# Patient Record
Sex: FEMALE | Race: BLACK OR AFRICAN AMERICAN
[De-identification: names, ages, dates, MRNs, and addresses within clinical notes are randomized per-mention and may not be internally consistent; named-entity substitution may affect disease eponyms.]

---

## 2020-11-01 ENCOUNTER — HOSPITAL ENCOUNTER (EMERGENCY)
Dept: HOSPITAL 72 - EMR | Age: 35
Discharge: HOME | End: 2020-11-01
Payer: SELF-PAY

## 2020-11-01 VITALS — DIASTOLIC BLOOD PRESSURE: 85 MMHG | SYSTOLIC BLOOD PRESSURE: 125 MMHG

## 2020-11-01 VITALS — SYSTOLIC BLOOD PRESSURE: 122 MMHG | DIASTOLIC BLOOD PRESSURE: 70 MMHG

## 2020-11-01 VITALS — DIASTOLIC BLOOD PRESSURE: 70 MMHG | SYSTOLIC BLOOD PRESSURE: 122 MMHG

## 2020-11-01 VITALS — BODY MASS INDEX: 0.83 KG/M2 | WEIGHT: 5.44 LBS | HEIGHT: 68 IN

## 2020-11-01 DIAGNOSIS — Z88.0: ICD-10-CM

## 2020-11-01 DIAGNOSIS — R10.84: Primary | ICD-10-CM

## 2020-11-01 DIAGNOSIS — R11.2: ICD-10-CM

## 2020-11-01 LAB
ADD MANUAL DIFF: NO
ALBUMIN SERPL-MCNC: 3.8 G/DL (ref 3.4–5)
ALBUMIN/GLOB SERPL: 1 {RATIO} (ref 1–2.7)
ALP SERPL-CCNC: 54 U/L (ref 46–116)
ALT SERPL-CCNC: 13 U/L (ref 12–78)
ANION GAP SERPL CALC-SCNC: 13 MMOL/L (ref 5–15)
APPEARANCE UR: (no result)
APTT PPP: (no result) S
AST SERPL-CCNC: 19 U/L (ref 15–37)
BASOPHILS NFR BLD AUTO: 1.3 % (ref 0–2)
BILIRUB SERPL-MCNC: 0.5 MG/DL (ref 0.2–1)
BUN SERPL-MCNC: 6 MG/DL (ref 7–18)
CALCIUM SERPL-MCNC: 8.4 MG/DL (ref 8.5–10.1)
CHLORIDE SERPL-SCNC: 105 MMOL/L (ref 98–107)
CO2 SERPL-SCNC: 23 MMOL/L (ref 21–32)
CREAT SERPL-MCNC: 0.9 MG/DL (ref 0.55–1.3)
EOSINOPHIL NFR BLD AUTO: 0.5 % (ref 0–3)
ERYTHROCYTE [DISTWIDTH] IN BLOOD BY AUTOMATED COUNT: 17.4 % (ref 11.6–14.8)
GLOBULIN SER-MCNC: 3.9 G/DL
GLUCOSE UR STRIP-MCNC: NEGATIVE MG/DL
HCT VFR BLD CALC: 32.7 % (ref 37–47)
HGB BLD-MCNC: 9.7 G/DL (ref 12–16)
KETONES UR QL STRIP: (no result)
LEUKOCYTE ESTERASE UR QL STRIP: NEGATIVE
LYMPHOCYTES NFR BLD AUTO: 26.4 % (ref 20–45)
MCV RBC AUTO: 74 FL (ref 80–99)
MONOCYTES NFR BLD AUTO: 12.5 % (ref 1–10)
NEUTROPHILS NFR BLD AUTO: 59.3 % (ref 45–75)
NITRITE UR QL STRIP: NEGATIVE
PH UR STRIP: 8 [PH] (ref 4.5–8)
PLATELET # BLD: 184 K/UL (ref 150–450)
POTASSIUM SERPL-SCNC: 3.3 MMOL/L (ref 3.5–5.1)
PROT UR QL STRIP: (no result)
RBC # BLD AUTO: 4.41 M/UL (ref 4.2–5.4)
SODIUM SERPL-SCNC: 141 MMOL/L (ref 136–145)
SP GR UR STRIP: 1.01 (ref 1–1.03)
UROBILINOGEN UR-MCNC: NORMAL MG/DL (ref 0–1)
WBC # BLD AUTO: 4.7 K/UL (ref 4.8–10.8)

## 2020-11-01 PROCEDURE — 85025 COMPLETE CBC W/AUTO DIFF WBC: CPT

## 2020-11-01 PROCEDURE — 81003 URINALYSIS AUTO W/O SCOPE: CPT

## 2020-11-01 PROCEDURE — 96361 HYDRATE IV INFUSION ADD-ON: CPT

## 2020-11-01 PROCEDURE — 80053 COMPREHEN METABOLIC PANEL: CPT

## 2020-11-01 PROCEDURE — 36415 COLL VENOUS BLD VENIPUNCTURE: CPT

## 2020-11-01 PROCEDURE — 99284 EMERGENCY DEPT VISIT MOD MDM: CPT

## 2020-11-01 PROCEDURE — 80307 DRUG TEST PRSMV CHEM ANLYZR: CPT

## 2020-11-01 PROCEDURE — 96374 THER/PROPH/DIAG INJ IV PUSH: CPT

## 2020-11-01 PROCEDURE — 83690 ASSAY OF LIPASE: CPT

## 2020-11-01 PROCEDURE — 96375 TX/PRO/DX INJ NEW DRUG ADDON: CPT

## 2020-11-01 PROCEDURE — 96376 TX/PRO/DX INJ SAME DRUG ADON: CPT

## 2020-11-01 PROCEDURE — 81025 URINE PREGNANCY TEST: CPT

## 2020-11-01 PROCEDURE — 87086 URINE CULTURE/COLONY COUNT: CPT

## 2020-11-01 NOTE — NUR
ED Nurse Note:



DR Lopez at the bed side. Patient awake and responding well at this time. 
Patient reports lower abd pain and not actively vomiting at this time.

## 2020-11-01 NOTE — NUR
ED Nurse Note:



Patient brought in by ambulance  from home with c/o abdominal pain onset 
2 days ago. Patient rates pain 10/10 and on epigastric area. Patient has hx of 
ulcers and was seen at [facility] for same problem and was taking antacids. Patient 
is nauseated and vomited at bedside approx 20mls of gastric juice. Patient 
denies injury/trauma, fever and chills, CP/SOB/, blood in stool. Patient is 
AAOX4 and ambulatory

## 2020-11-01 NOTE — NUR
-------------------------------------------------------------------------------

           *** Note dio in EDM - 11/01/20 at 0832 by JAS ***            

-------------------------------------------------------------------------------

ER DISCHARGE NOTE:



Patient is cleared to be discharged per ERMD, pt is aox4, on room air, with 
stable vital signs. pt was given dc and prescription instructions, pt was able 
to verbalize understanding, pt id band and iv site removed without 
complications. pt was assisted on a wheelchair to a car. pt took all belongings 
and left with her roommate.

## 2020-11-01 NOTE — NUR
ER DISCHARGE NOTE:



Patient is cleared to be discharged per ERMD, pt is aox4, on room air, with 
stable vital signs. pt was given dc and prescription instructions, pt was able 
to verbalize understanding, pt id band and iv site removed without 
complications. pt was assisted on a wheelchair to a car. pt took all belongings 
and left with her roommate.

## 2020-11-01 NOTE — EMERGENCY ROOM REPORT
History of Present Illness


General


Chief Complaint:  To Be Triaged


Source:  Patient, EMS


 (Keyur Masters MD)





Present Illness


HPI


This is a 35-year-old female with history of chronic abdominal pain.  She says 

she gets it frequently since 2018.  She said is due to stress.  She presents 

with complaint abdominal pain.  Onset for last 2 days.  She has nausea and 

vomiting.  Unable to keep anything down.  No fever or chills.  No diarrhea.  

Pain is 10 out of 10.  Nothing made it better.  Nothing made it worse.  She was 

just at [facility] yesterday and was discharged with prescription for medication.  

She says she is unable to fill the until Monday.  Pain came back so she called 

[phone].


 (Keyur Masters MD)


Allergies:  


Uncoded Allergies:  


     PENICILLIN (Allergy, Mild, 20)





COVID-19 Screening


Contact w/high risk pt:  No


Experienced COVID-19 symptoms?:  Yes


COVID-19 Testing performed PTA:  No


 (Keyur Masters MD)





Patient History


Past Medical History:  see triage record, old chart reviewed


Past Surgical History:  other


Pertinent Family History:  none


Social History:  Denies: smoking


Last Menstrual Period:  UNK


Pregnant Now:  No


:  0


Para:  0


Immunizations:  other


Reviewed Nursing Documentation:  PMH: Agreed; PSxH: Agreed (Keyur Masters MD)





Nursing Documentation-PMH


Past Medical History:  No History, Except For


Hx Gastrointestinal Problems:  Yes - ULCERS 


 (Keyur Masters MD)





Review of Systems


Eye:  Denies: eye pain, blurred vision


ENT:  Denies: ear pain, nose congestion, throat swelling


Respiratory:  Denies: cough, shortness of breath


Cardiovascular:  Denies: chest pain, palpitations


Gastrointestinal:  Reports: abdominal pain, nausea, vomiting; Denies: diarrhea


Musculoskeletal:  Denies: back pain, joint pain


Skin:  Denies: rash


Neurological:  Denies: headache, numbness


Endocrine:  Denies: increased thirst, increased urine


Hematologic/Lymphatic:  Denies: easy bruising


All Other Systems:  negative except mentioned in HPI


 (Keyur Masters MD)





Physical Exam





Vital Signs








  Date Time  Temp Pulse Resp B/P (MAP) Pulse Ox O2 Delivery O2 Flow Rate FiO2


 


20 05:22 98.2 70 24 116/72 (87) 99 Room Air  





Vitals normal


Sp02 EP Interpretation:  reviewed, normal


General Appearance:  well appearing, no apparent distress, alert


Head:  normocephalic, atraumatic


Eyes:  bilateral eye PERRL, bilateral eye EOMI


ENT:  hearing grossly normal, normal pharynx


Neck:  full range of motion, supple, no meningismus


Respiratory:  chest non-tender, lungs clear, normal breath sounds


Cardiovascular #1:  regular rate, rhythm, no murmur


Gastrointestinal:  no mass, no organomegaly, no bruit, non-distended, tenderness

- Diffuse.  Mild.  Abdomen is soft., decreased bowel sounds


Musculoskeletal:  back normal, normal range of motion, gait/station normal


Psychiatric:  mood/affect normal


 (Keyur Masters MD)





Medical Decision Making


Diagnostic Impression:  


   Primary Impression:  


   Abdominal pain


   Qualified Codes:  R10.84 - Generalized abdominal pain


   Additional Impressions:  


   Vomiting


   Qualified Codes:  R11.2 - Nausea with vomiting, unspecified


   Cannabinoid hyperemesis syndrome


ER Course


Patient presents with abdominal pain.  This appear to be exacerbation of chronic

problem.  Abdominal exam is soft.  Labs are pending.  I will sign this patient 

out to Dr. Lopez.


 (Keyur Masters MD)


ER Course


Patient was initially seen by Dr. Keyur Masters and and then signed out to me for 

final disposition.  I have also seen the patient.  And fully examined the 

patient.  Patient's physical exam are such that she was nauseous and slightly 

tender in the epigastrium.  Patient received pain medications prior to my 

evaluation.  Of which patient was very comfortable when I saw her.  With only 

very mild epigastric discomfort.  She was a little sleepy.  Patient's laboratory

work-up was negative.  Patient received a total of 2 L fluid bolus in the 

emergency department.  Patient was reevaluated after receiving the fluid bolus 

pain medications and laboratory work-up.  Patient states that she has no 

symptoms at this time would like to be discharged.  Patient states that she does

have a history of this occurring in the past.  She was actually seen at [facility] 

where she had a full work-up and was given a prescription for Zofran and Pepcid.

 We also discovered the THC in patient's urine.  I informed the patient that 

this sometimes can cause cannabinoid hyperemesis syndrome and advised that she 

discontinue any further use of THC.  Patient voiced understanding.  Patient was 

given a prescription for Zofran and omeprazole.  Patient is advised to follow up

with primary doctor in 2-3 days and return the emergency room for any worsening 

symptoms and as needed.





Labs








Test


 20


05:40 20


06:00


 


White Blood Count


 4.7 K/UL


(4.8-10.8) 





 


Red Blood Count


 4.41 M/UL


(4.20-5.40) 





 


Hemoglobin


 9.7 G/DL


(12.0-16.0) 





 


Hematocrit


 32.7 %


(37.0-47.0) 





 


Mean Corpuscular Volume 74 FL (80-99)  


 


Mean Corpuscular Hemoglobin


 22.1 PG


(27.0-31.0) 





 


Mean Corpuscular Hemoglobin


Concent 29.8 G/DL


(32.0-36.0) 





 


Red Cell Distribution Width


 17.4 %


(11.6-14.8) 





 


Platelet Count


 184 K/UL


(150-450) 





 


Mean Platelet Volume


 14.4 FL


(6.5-10.1) 





 


Neutrophils (%) (Auto)


 59.3 %


(45.0-75.0) 





 


Lymphocytes (%) (Auto)


 26.4 %


(20.0-45.0) 





 


Monocytes (%) (Auto)


 12.5 %


(1.0-10.0) 





 


Eosinophils (%) (Auto)


 0.5 %


(0.0-3.0) 





 


Basophils (%) (Auto)


 1.3 %


(0.0-2.0) 





 


Sodium Level


 141 MMOL/L


(136-145) 





 


Potassium Level


 3.3 MMOL/L


(3.5-5.1) 





 


Chloride Level


 105 MMOL/L


() 





 


Carbon Dioxide Level


 23 MMOL/L


(21-32) 





 


Anion Gap


 13 mmol/L


(5-15) 





 


Blood Urea Nitrogen 6 mg/dL (7-18)  


 


Creatinine


 0.9 MG/DL


(0.55-1.30) 





 


Estimat Glomerular Filtration


Rate > 60 mL/min


(>60) 





 


Glucose Level


 96 MG/DL


() 





 


Calcium Level


 8.4 MG/DL


(8.5-10.1) 





 


Total Bilirubin


 0.5 MG/DL


(0.2-1.0) 





 


Aspartate Amino Transf


(AST/SGOT) 19 U/L (15-37) 


 





 


Alanine Aminotransferase


(ALT/SGPT) 13 U/L (12-78) 


 





 


Alkaline Phosphatase


 54 U/L


() 





 


Total Protein


 7.7 G/DL


(6.4-8.2) 





 


Albumin


 3.8 G/DL


(3.4-5.0) 





 


Globulin 3.9 g/dL  


 


Albumin/Globulin Ratio 1.0 (1.0-2.7)  


 


Lipase


 176 U/L


() 





 


Urine Color  Pale yellow 


 


Urine Appearance


 


 Slightly


cloudy


 


Urine pH  8 (4.5-8.0) 


 


Urine Specific Gravity


 


 1.015


(1.005-1.035)


 


Urine Protein  1+ (NEGATIVE) 


 


Urine Glucose (UA)


 


 Negative


(NEGATIVE)


 


Urine Ketones  4+ (NEGATIVE) 


 


Urine Blood


 


 Negative


(NEGATIVE)


 


Urine Nitrite


 


 Negative


(NEGATIVE)


 


Urine Bilirubin


 


 Negative


(NEGATIVE)


 


Urine Urobilinogen


 


 Normal MG/DL


(0.0-1.0)


 


Urine Leukocyte Esterase


 


 Negative


(NEGATIVE)


 


Urine RBC


 


 0-2 /HPF (0 -


2)


 


Urine WBC


 


 0-2 /HPF (0 -


2)


 


Urine Squamous Epithelial


Cells 


 Moderate /LPF


(NONE/OCC)


 


Urine Bacteria


 


 Many /HPF


(NONE)


 


Urine HCG, Qualitative


 


 Negative


(NEGATIVE)


 


Urine Opiates Screen


 


 Negative


(NEGATIVE)


 


Urine Barbiturates Screen


 


 Negative


(NEGATIVE)


 


Phencyclidine (PCP) Screen


 


 Negative


(NEGATIVE)


 


Urine Amphetamines Screen


 


 Negative


(NEGATIVE)


 


Urine Benzodiazepines Screen


 


 Negative


(NEGATIVE)


 


Urine Cocaine Screen


 


 Negative


(NEGATIVE)


 


Urine Marijuana (THC) Screen


 


 Positive


(NEGATIVE)








 (Sunil Lopez MD)





Last Vital Signs








  Date Time  Temp Pulse Resp B/P (MAP) Pulse Ox O2 Delivery O2 Flow Rate FiO2


 


20 05:22 98.2 70 24 116/72 (87) 99 Room Air  








Status:  improved


 (Keyur Masters MD)


Status:  improved


 (Sunil Lopez MD)


Disposition:  HOME, SELF-CARE


Condition:  Stable


Scripts


Ondansetron (Zofran) 4 Mg Tablet


4 MG ORAL Q6H PRN for Nausea & Vomiting, #20 TAB 0 Refills


   Prov: Sunil Lopez MD         20 


Omeprazole (OMEPRAZOLE) 40 Mg Capsule.


40 MG ORAL DAILY for Gerd, #30 CAP


   Prov: Sunil Lopez MD         20











Keyur Masters MD                   2020 05:38


Sunil Lopez MD                 2020 07:45

## 2020-11-01 NOTE — NUR
-------------------------------------------------------------------------------

           *** Note dio in EDM - 11/01/20 at 0831 by JAS ***            

-------------------------------------------------------------------------------

ER DISCHARGE NOTE:



Patient is cleared to be discharged per ERMD, pt is aox4, on room air, with 
stable vital signs. pt was given dc and prescription instructions, pt was able 
to verbalize understanding, pt id band and iv site removed without 
complications. pt is able to ambulate with steady gait. pt took all belongings.

## 2020-11-02 ENCOUNTER — HOSPITAL ENCOUNTER (EMERGENCY)
Dept: HOSPITAL 72 - EMR | Age: 35
Discharge: HOME | End: 2020-11-02
Payer: SELF-PAY

## 2020-11-02 VITALS — WEIGHT: 178 LBS | BODY MASS INDEX: 27.94 KG/M2 | HEIGHT: 67 IN

## 2020-11-02 VITALS — SYSTOLIC BLOOD PRESSURE: 133 MMHG | DIASTOLIC BLOOD PRESSURE: 74 MMHG

## 2020-11-02 VITALS — SYSTOLIC BLOOD PRESSURE: 129 MMHG | DIASTOLIC BLOOD PRESSURE: 78 MMHG

## 2020-11-02 DIAGNOSIS — Z88.0: ICD-10-CM

## 2020-11-02 DIAGNOSIS — R10.84: ICD-10-CM

## 2020-11-02 DIAGNOSIS — F12.988: Primary | ICD-10-CM

## 2020-11-02 DIAGNOSIS — R11.10: ICD-10-CM

## 2020-11-02 LAB
ADD MANUAL DIFF: NO
ALBUMIN SERPL-MCNC: 3.7 G/DL (ref 3.4–5)
ALBUMIN/GLOB SERPL: 0.9 {RATIO} (ref 1–2.7)
ALP SERPL-CCNC: 51 U/L (ref 46–116)
ALT SERPL-CCNC: 13 U/L (ref 12–78)
ANION GAP SERPL CALC-SCNC: 14 MMOL/L (ref 5–15)
APPEARANCE UR: CLEAR
APTT PPP: (no result) S
AST SERPL-CCNC: 17 U/L (ref 15–37)
BASOPHILS NFR BLD AUTO: 2.2 % (ref 0–2)
BILIRUB SERPL-MCNC: 0.6 MG/DL (ref 0.2–1)
BUN SERPL-MCNC: 4 MG/DL (ref 7–18)
CALCIUM SERPL-MCNC: 8.5 MG/DL (ref 8.5–10.1)
CHLORIDE SERPL-SCNC: 104 MMOL/L (ref 98–107)
CO2 SERPL-SCNC: 22 MMOL/L (ref 21–32)
CREAT SERPL-MCNC: 0.9 MG/DL (ref 0.55–1.3)
EOSINOPHIL NFR BLD AUTO: 0 % (ref 0–3)
ERYTHROCYTE [DISTWIDTH] IN BLOOD BY AUTOMATED COUNT: 17.1 % (ref 11.6–14.8)
GLOBULIN SER-MCNC: 3.9 G/DL
GLUCOSE UR STRIP-MCNC: NEGATIVE MG/DL
HCT VFR BLD CALC: 32.3 % (ref 37–47)
HGB BLD-MCNC: 9.4 G/DL (ref 12–16)
KETONES UR QL STRIP: (no result)
LEUKOCYTE ESTERASE UR QL STRIP: NEGATIVE
LYMPHOCYTES NFR BLD AUTO: 53.2 % (ref 20–45)
MCV RBC AUTO: 76 FL (ref 80–99)
MONOCYTES NFR BLD AUTO: 7.1 % (ref 1–10)
NEUTROPHILS NFR BLD AUTO: 37.5 % (ref 45–75)
NITRITE UR QL STRIP: NEGATIVE
PH UR STRIP: 6 [PH] (ref 4.5–8)
PLATELET # BLD: 162 K/UL (ref 150–450)
POTASSIUM SERPL-SCNC: 3.2 MMOL/L (ref 3.5–5.1)
PROT UR QL STRIP: NEGATIVE
RBC # BLD AUTO: 4.23 M/UL (ref 4.2–5.4)
SODIUM SERPL-SCNC: 140 MMOL/L (ref 136–145)
SP GR UR STRIP: 1.02 (ref 1–1.03)
UROBILINOGEN UR-MCNC: NORMAL MG/DL (ref 0–1)
WBC # BLD AUTO: 9.1 K/UL (ref 4.8–10.8)

## 2020-11-02 PROCEDURE — 99284 EMERGENCY DEPT VISIT MOD MDM: CPT

## 2020-11-02 PROCEDURE — 83690 ASSAY OF LIPASE: CPT

## 2020-11-02 PROCEDURE — 81003 URINALYSIS AUTO W/O SCOPE: CPT

## 2020-11-02 PROCEDURE — 85025 COMPLETE CBC W/AUTO DIFF WBC: CPT

## 2020-11-02 PROCEDURE — 36415 COLL VENOUS BLD VENIPUNCTURE: CPT

## 2020-11-02 PROCEDURE — 81025 URINE PREGNANCY TEST: CPT

## 2020-11-02 PROCEDURE — 96374 THER/PROPH/DIAG INJ IV PUSH: CPT

## 2020-11-02 PROCEDURE — 96376 TX/PRO/DX INJ SAME DRUG ADON: CPT

## 2020-11-02 PROCEDURE — 96375 TX/PRO/DX INJ NEW DRUG ADDON: CPT

## 2020-11-02 PROCEDURE — 96361 HYDRATE IV INFUSION ADD-ON: CPT

## 2020-11-02 PROCEDURE — 80053 COMPREHEN METABOLIC PANEL: CPT

## 2020-11-02 NOTE — NUR
ED Nurse Note:



Patient brought in by ambulance  from home d/t n/v and abdominal pain 
10/10 for 3 days, patient reports she was diagnosed with stomach ulcers 
yesterday. Patient aao x 4 and ambulatory. Patient changed into gown and placed 
on cardiac monitor. No acute distress noted during assessment.

## 2020-11-02 NOTE — EMERGENCY ROOM REPORT
History of Present Illness


General


Chief Complaint:  Abdominal Pain


Source:  Patient


 (Keyur Masters MD)





Present Illness


HPI


Is a 35-year-old female with a history of cyclic vomiting syndrome secondary to 

marijuana.  She presents with chief complaint abdominal pain with nausea and 

vomiting.  Onset today.  She was here 24 hours ago for the same thing.  Before 

that she was at [facility].  She said that she could not fill her medication until 

Monday.  This is a recurrent issue for her.  She did well when she was here 

yesterday after IV fluid and pain medication.  She was able to tolerate p.o. 

intake.  When she went home symptoms came back when she started trying to eat 

and drink again.  Pain is sharp in nature.  10 out of 10.  Vomiting is nonbloody

nonbilious.  Nothing made it better.  Drinking made it worse.  Denies any other 

complaint.


 (Keyur Masters MD)


Allergies:  


Uncoded Allergies:  


     PENICILLIN (Allergy, Mild, 11/1/20)





COVID-19 Screening


Contact w/high risk pt:  No


Experienced COVID-19 symptoms?:  No


COVID-19 Testing performed PTA:  No


 (Keyur Masters MD)





Patient History


Past Medical History:  see triage record, old chart reviewed


Past Surgical History:  none


Pertinent Family History:  none


Social History:  Denies: smoking


Last Menstrual Period:  10/01/20


Pregnant Now:  No


Immunizations:  other


Reviewed Nursing Documentation:  PMH: Agreed; PSxH: Agreed (Keyur Masters MD)





Nursing Documentation-PMH


Past Medical History:  No History, Except For


Hx Gastrointestinal Problems:  Yes - ULCERS 


 (Keyur Masters MD)





Review of Systems


Eye:  Denies: eye pain, blurred vision


ENT:  Denies: ear pain, nose congestion, throat swelling


Respiratory:  Denies: cough, shortness of breath


Cardiovascular:  Denies: chest pain, palpitations


Gastrointestinal:  Reports: abdominal pain, nausea, vomiting; Denies: diarrhea


Musculoskeletal:  Denies: back pain, joint pain


Skin:  Denies: rash


Neurological:  Denies: headache, numbness


Endocrine:  Denies: increased thirst, increased urine


Hematologic/Lymphatic:  Denies: easy bruising


All Other Systems:  negative except mentioned in HPI


 (Keyur Masters MD)





Physical Exam





Vital Signs








  Date Time  Temp Pulse Resp B/P (MAP) Pulse Ox O2 Delivery O2 Flow Rate FiO2


 


11/2/20 04:40 99.0 54 18 133/74 (93) 100 Room Air  





Vitals normal


Sp02 EP Interpretation:  reviewed, normal


General Appearance:  well appearing, no apparent distress, alert


Head:  normocephalic, atraumatic


Eyes:  bilateral eye PERRL, bilateral eye EOMI


ENT:  hearing grossly normal, normal pharynx


Neck:  full range of motion, supple, no meningismus


Respiratory:  chest non-tender, lungs clear, normal breath sounds


Cardiovascular #1:  regular rate, rhythm, no murmur


Gastrointestinal:  normal bowel sounds, no mass, no organomegaly, no bruit, non-

distended, tenderness - Diffuse


Musculoskeletal:  back normal, normal range of motion, gait/station normal


Psychiatric:  mood/affect normal


 (Keyur Masters MD)





Medical Decision Making


Diagnostic Impression:  


   Primary Impression:  


   Abdominal pain


   Qualified Codes:  R10.84 - Generalized abdominal pain


   Additional Impression:  


   Cannabinoid hyperemesis syndrome


ER Course


Presents with abdominal pain with vomiting.  This is probably cyclic vomiting 

syndrome secondary to cannabis use.  No evidence of an acute abdomen.  Patient 

will be hydrated.  If not better, may need to be admitted to the hospital.


 (Keyur Masters MD)


ER Course


Patient signed out to me pending reassessment.  She has had multiple recent 

visits for cyclical vomiting.  Patient observed by me for an additional 2 hours 

with no recurrent vomiting.  Sleeping comfortably.  Pain controlled.  On my 

reassessment feeling improved.  Will be discharged home.  She does have 

prescriptions for antiemetics that she has not filled from her recent visit.  I 

instructed her to fill this medication today.  Avoid marijuana use.  And follow-

up with PMD.  Given return precautions.


 (Brandon Maguire M.D.)





Last Vital Signs








  Date Time  Temp Pulse Resp B/P (MAP) Pulse Ox O2 Delivery O2 Flow Rate FiO2


 


11/2/20 04:40 99.0 54 18 133/74 (93) 100 Room Air  








Status:  improved


 (Keyur Masters MD)


Disposition:  HOME, SELF-CARE


Condition:  Stable


Referrals:  


NOT CHOSEN IPA/MD,REFERRING (PCP)











Keyur Masters MD                   Nov 2, 2020 04:52


Brandon Maguire M.D.             Nov 2, 2020 07:22

## 2020-11-02 NOTE — NUR
ER DISCHARGE NOTE: Patient is cleared to be discharged per ERMD, pt is aox4, on 
room air, with stable vital signs. pt was given dc and prescription 
instructions, pt was able to verbalize understanding, pt id band and iv site 
removed without complications. pt is able to ambulate with steady gait. pt took 
all belongings.

## 2020-11-03 ENCOUNTER — HOSPITAL ENCOUNTER (INPATIENT)
Dept: HOSPITAL 72 - EMR | Age: 35
LOS: 3 days | Discharge: HOME | DRG: 425 | End: 2020-11-06
Payer: MEDICAID

## 2020-11-03 VITALS — HEIGHT: 67 IN | BODY MASS INDEX: 27.31 KG/M2 | WEIGHT: 174 LBS

## 2020-11-03 VITALS — DIASTOLIC BLOOD PRESSURE: 81 MMHG | SYSTOLIC BLOOD PRESSURE: 122 MMHG

## 2020-11-03 VITALS — SYSTOLIC BLOOD PRESSURE: 107 MMHG | DIASTOLIC BLOOD PRESSURE: 64 MMHG

## 2020-11-03 VITALS — SYSTOLIC BLOOD PRESSURE: 113 MMHG | DIASTOLIC BLOOD PRESSURE: 69 MMHG

## 2020-11-03 VITALS — SYSTOLIC BLOOD PRESSURE: 107 MMHG | DIASTOLIC BLOOD PRESSURE: 59 MMHG

## 2020-11-03 DIAGNOSIS — D50.9: ICD-10-CM

## 2020-11-03 DIAGNOSIS — Z88.0: ICD-10-CM

## 2020-11-03 DIAGNOSIS — F12.10: ICD-10-CM

## 2020-11-03 DIAGNOSIS — R10.9: ICD-10-CM

## 2020-11-03 DIAGNOSIS — D69.6: ICD-10-CM

## 2020-11-03 DIAGNOSIS — R11.2: ICD-10-CM

## 2020-11-03 DIAGNOSIS — E87.6: Primary | ICD-10-CM

## 2020-11-03 LAB
ADD MANUAL DIFF: NO
ADD MANUAL DIFF: YES
ALBUMIN SERPL-MCNC: 4.2 G/DL (ref 3.4–5)
ALBUMIN/GLOB SERPL: 1 {RATIO} (ref 1–2.7)
ALP SERPL-CCNC: 60 U/L (ref 46–116)
ALT SERPL-CCNC: 9 U/L (ref 12–78)
ANION GAP SERPL CALC-SCNC: 16 MMOL/L (ref 5–15)
APPEARANCE UR: (no result)
APTT PPP: (no result) S
AST SERPL-CCNC: 16 U/L (ref 15–37)
BASOPHILS NFR BLD AUTO: 4.7 % (ref 0–2)
BILIRUB SERPL-MCNC: 0.6 MG/DL (ref 0.2–1)
BUN SERPL-MCNC: 5 MG/DL (ref 7–18)
CALCIUM SERPL-MCNC: 8.9 MG/DL (ref 8.5–10.1)
CHLORIDE SERPL-SCNC: 101 MMOL/L (ref 98–107)
CO2 SERPL-SCNC: 22 MMOL/L (ref 21–32)
CREAT SERPL-MCNC: 0.9 MG/DL (ref 0.55–1.3)
EOSINOPHIL NFR BLD AUTO: 0 % (ref 0–3)
ERYTHROCYTE [DISTWIDTH] IN BLOOD BY AUTOMATED COUNT: 17.4 % (ref 11.6–14.8)
ERYTHROCYTE [DISTWIDTH] IN BLOOD BY AUTOMATED COUNT: 17.7 % (ref 11.6–14.8)
GLOBULIN SER-MCNC: 4.3 G/DL
GLUCOSE UR STRIP-MCNC: NEGATIVE MG/DL
HCT VFR BLD CALC: 31.9 % (ref 37–47)
HCT VFR BLD CALC: 36.9 % (ref 37–47)
HGB BLD-MCNC: 10.3 G/DL (ref 12–16)
HGB BLD-MCNC: 8.8 G/DL (ref 12–16)
KETONES UR QL STRIP: (no result)
LEUKOCYTE ESTERASE UR QL STRIP: NEGATIVE
LYMPHOCYTES NFR BLD AUTO: 13.9 % (ref 20–45)
MCV RBC AUTO: 79 FL (ref 80–99)
MCV RBC AUTO: 80 FL (ref 80–99)
MONOCYTES NFR BLD AUTO: 13.1 % (ref 1–10)
NEUTROPHILS NFR BLD AUTO: 68.3 % (ref 45–75)
NITRITE UR QL STRIP: NEGATIVE
PH UR STRIP: 6 [PH] (ref 4.5–8)
PLATELET # BLD: 147 K/UL (ref 150–450)
PLATELET # BLD: 60 K/UL (ref 150–450)
POTASSIUM SERPL-SCNC: 3 MMOL/L (ref 3.5–5.1)
PROT UR QL STRIP: NEGATIVE
RBC # BLD AUTO: 4.01 M/UL (ref 4.2–5.4)
RBC # BLD AUTO: 4.67 M/UL (ref 4.2–5.4)
SODIUM SERPL-SCNC: 139 MMOL/L (ref 136–145)
SP GR UR STRIP: 1.01 (ref 1–1.03)
UROBILINOGEN UR-MCNC: NORMAL MG/DL (ref 0–1)
WBC # BLD AUTO: 10.7 K/UL (ref 4.8–10.8)
WBC # BLD AUTO: 4.8 K/UL (ref 4.8–10.8)

## 2020-11-03 PROCEDURE — 96372 THER/PROPH/DIAG INJ SC/IM: CPT

## 2020-11-03 PROCEDURE — 83615 LACTATE (LD) (LDH) ENZYME: CPT

## 2020-11-03 PROCEDURE — 82607 VITAMIN B-12: CPT

## 2020-11-03 PROCEDURE — 87340 HEPATITIS B SURFACE AG IA: CPT

## 2020-11-03 PROCEDURE — 82728 ASSAY OF FERRITIN: CPT

## 2020-11-03 PROCEDURE — 87086 URINE CULTURE/COLONY COUNT: CPT

## 2020-11-03 PROCEDURE — 76700 US EXAM ABDOM COMPLETE: CPT

## 2020-11-03 PROCEDURE — 80053 COMPREHEN METABOLIC PANEL: CPT

## 2020-11-03 PROCEDURE — 86709 HEPATITIS A IGM ANTIBODY: CPT

## 2020-11-03 PROCEDURE — 82150 ASSAY OF AMYLASE: CPT

## 2020-11-03 PROCEDURE — 82746 ASSAY OF FOLIC ACID SERUM: CPT

## 2020-11-03 PROCEDURE — 96375 TX/PRO/DX INJ NEW DRUG ADDON: CPT

## 2020-11-03 PROCEDURE — 82270 OCCULT BLOOD FECES: CPT

## 2020-11-03 PROCEDURE — 96376 TX/PRO/DX INJ SAME DRUG ADON: CPT

## 2020-11-03 PROCEDURE — 85007 BL SMEAR W/DIFF WBC COUNT: CPT

## 2020-11-03 PROCEDURE — 86703 HIV-1/HIV-2 1 RESULT ANTBDY: CPT

## 2020-11-03 PROCEDURE — 86803 HEPATITIS C AB TEST: CPT

## 2020-11-03 PROCEDURE — 96368 THER/DIAG CONCURRENT INF: CPT

## 2020-11-03 PROCEDURE — 86705 HEP B CORE ANTIBODY IGM: CPT

## 2020-11-03 PROCEDURE — 99285 EMERGENCY DEPT VISIT HI MDM: CPT

## 2020-11-03 PROCEDURE — 96365 THER/PROPH/DIAG IV INF INIT: CPT

## 2020-11-03 PROCEDURE — 83690 ASSAY OF LIPASE: CPT

## 2020-11-03 PROCEDURE — 80307 DRUG TEST PRSMV CHEM ANLYZR: CPT

## 2020-11-03 PROCEDURE — 81003 URINALYSIS AUTO W/O SCOPE: CPT

## 2020-11-03 PROCEDURE — 83550 IRON BINDING TEST: CPT

## 2020-11-03 PROCEDURE — 74177 CT ABD & PELVIS W/CONTRAST: CPT

## 2020-11-03 PROCEDURE — 83540 ASSAY OF IRON: CPT

## 2020-11-03 PROCEDURE — 85025 COMPLETE CBC W/AUTO DIFF WBC: CPT

## 2020-11-03 PROCEDURE — 85610 PROTHROMBIN TIME: CPT

## 2020-11-03 PROCEDURE — 36415 COLL VENOUS BLD VENIPUNCTURE: CPT

## 2020-11-03 RX ADMIN — LACTULOSE SCH GM: 20 SOLUTION ORAL at 17:47

## 2020-11-03 RX ADMIN — DEXTROSE, SODIUM CHLORIDE, AND POTASSIUM CHLORIDE SCH MLS/HR: 5; .45; .075 INJECTION INTRAVENOUS at 17:48

## 2020-11-03 NOTE — NUR
ED Nurse Note: Per , platelet count is not accurate due to"clamping on 
the smear" Dr. Dewitt notified and ordered to repeat Platelet count. DEDRA Farmer 
made kellyrre.

## 2020-11-03 NOTE — GENERAL PROGRESS NOTE
Subjective


ROS Limited/Unobtainable:  Yes


Allergies:  


Uncoded Allergies:  


     PENICILLIN (Allergy, Mild, 11/1/20)





Objective





Last 24 Hour Vital Signs








  Date Time  Temp Pulse Resp B/P (MAP) Pulse Ox O2 Delivery O2 Flow Rate FiO2


 


11/3/20 15:08 97.2 60 14 122/81 99 Room Air  


 


11/3/20 14:17 97.2       


 


11/3/20 13:05 97.2       


 


11/3/20 12:56  60 14 122/81 99 Room Air  


 


11/3/20 11:40 97.2 87 18 113/69 99 Room Air  


 


11/3/20 11:40  58 18   Room Air  


 


11/3/20 11:21 97.2 58 18 113/69 (84) 99 Room Air  








Laboratory Tests


11/3/20 11:52: 


White Blood Count 10.7, Red Blood Count 4.67, Hemoglobin 10.3L, Hematocrit 36.9L

, Mean Corpuscular Volume 79L, Mean Corpuscular Hemoglobin 22.1L, Mean 

Corpuscular Hemoglobin Concent 28.0L, Red Cell Distribution Width 17.4H, 

Platelet Count 60#L, Mean Platelet Volume 15.6H, Neutrophils (%) (Auto) , 

Lymphocytes (%) (Auto) , Monocytes (%) (Auto) , Eosinophils (%) (Auto) , 

Basophils (%) (Auto) , Differential Total Cells Counted 100, Neutrophils % 

(Manual) 69, Lymphocytes % (Manual) 25, Monocytes % (Manual) 6, Eosinophils % 

(Manual) 0, Basophils % (Manual) 0, Band Neutrophils 0, Platelet Estimate 

Adequate, Platelet Morphology See comment, Clumped Platelets 3+, Hypochromasia 

2+, Microcytosis 2+, Urine Color Pale yellow, Urine Appearance Slightly cloudy, 

Urine pH 6, Urine Specific Gravity 1.015, Urine Protein Negative, Urine Glucose 

(UA) Negative, Urine Ketones 4+H, Urine Blood Negative, Urine Nitrite Negative, 

Urine Bilirubin Negative, Urine Urobilinogen Normal, Urine Leukocyte Esterase 

Negative, Urine RBC 0, Urine WBC 0, Urine Squamous Epithelial Cells Few, Urine 

Bacteria ModerateH, Sodium Level 139, Potassium Level 3.0L, Chloride Level 101, 

Carbon Dioxide Level 22, Anion Gap 16H, Blood Urea Nitrogen 5L, Creatinine 0.9, 

Estimat Glomerular Filtration Rate > 60, Glucose Level 83, Calcium Level 8.9, 

Total Bilirubin 0.6, Aspartate Amino Transf (AST/SGOT) 16, Alanine 

Aminotransferase (ALT/SGPT) 9L, Alkaline Phosphatase 60, Total Protein 8.5H, 

Albumin 4.2, Globulin 4.3, Albumin/Globulin Ratio 1.0, Lipase 226, Urine Opiates

Screen Negative, Urine Barbiturates Screen Negative, Phencyclidine (PCP) Screen 

Negative, Urine Amphetamines Screen Negative, Urine Benzodiazepines Screen 

Negative, Urine Cocaine Screen Negative, Urine Marijuana (THC) Screen PositiveH


11/3/20 14:27: 


White Blood Count [Pending], Red Blood Count [Pending], Hemoglobin [Pending], 

Hematocrit [Pending], Mean Corpuscular Volume [Pending], Mean Corpuscular 

Hemoglobin [Pending], Mean Corpuscular Hemoglobin Concent [Pending], Red Cell 

Distribution Width [Pending], Platelet Count [Pending], Mean Platelet Volume 

[Pending], Neutrophils (%) (Auto) [Pending], Lymphocytes (%) (Auto) [Pending], 

Monocytes (%) (Auto) [Pending], Eosinophils (%) (Auto) [Pending], Basophils (%) 

(Auto) [Pending]


Height (Feet):  5


Height (Inches):  7.00


Weight (Pounds):  174


General Appearance:  alert


EENT:  normal ENT inspection


Neck:  supple


Cardiovascular:  normal rate


Respiratory/Chest:  lungs clear


Abdomen:  normal bowel sounds, non tender, soft


Extremities:  non-tender





Assessment/Plan


Problem List:  


(1) Cannabinoid hyperemesis syndrome


ICD Codes:  R11.2 - Nausea with vomiting, unspecified; F12.90 - Cannabis use, 

unspecified, uncomplicated


SNOMED:  256075287, [id number]


(2) Vomiting


ICD Codes:  R11.10 - Vomiting, unspecified


SNOMED:  228915687


(3) Abdominal pain


ICD Codes:  R10.9 - Unspecified abdominal pain


SNOMED:  21630511


(4) Thrombocytopenia


ICD Codes:  D69.6 - Thrombocytopenia, unspecified


SNOMED:  683915694


(5) Anemia


ICD Codes:  D64.9 - Anemia, unspecified


SNOMED:  504839400


(6) Hypokalemia


ICD Codes:  E87.6 - Hypokalemia


SNOMED:  76669569


Assessment/Plan:


IVF


pain control


control nausea


anemia work up


replace K


repeat labs


clears and advance as tolerated


avoid THC











Lewis Barnett MD              Nov 3, 2020 15:28

## 2020-11-03 NOTE — NUR
NURSE NOTES:



PATIENT REMAINS STABLE. ASLEEP. BED IN LOW AND LOCKED POSITION. CALL LIGHT WITHIN REACH.

## 2020-11-03 NOTE — NUR
ED Nurse Note:



Patient walked in to ER from home c/o abd N/V and abd pain since 10/31, pt 
states that she has been in and out of the hospital for these symptoms, has 
been taking famotidine and ondansetron as prescribed at home but they are not 
working. Patient presented anxious, AAO x4, VSS at this time.

## 2020-11-03 NOTE — EMERGENCY ROOM REPORT
History of Present Illness


General


Chief Complaint:  Abdominal Pain


Source:  Patient





Present Illness


HPI


35-year-old female with past medical history of marijuana use presents with 

chief complaint of diffuse abdominal pain.  She was seen yesterday and the day 

before in the emergency department.  She states that she has been compliant with

the medication she was prescribed.  She has been taking Zofran and Pepcid 

without relief of her abdominal pain, nausea, and vomiting.  She denies melena, 

hematochezia, dysuria, hematuria, fall, trauma, fever, chest pain, shortness of 

breath, cough or any other symptoms.


The patient's symptoms were gradual onset, severity was moderate, duration since

3 days.  She states she last smoked marijuana several weeks ago, however there 

is secondhand smoke in her house and has been exposed for the past several 

days..  


Quality: Aching.  Poorly localized.


Denies heavy menstrual bleeding





Past medical history: Marijuana use


Past surgical history:  Denies





Smoking:  Denies


Alcohol use:  Denies


Drug use: ++ Marijuana 





Review of systems:


CONST: No fevers ++ chills, No night sweats


PULMONARY: No productive cough,  No shortness of breath 


CARDIAC: No chest pain, No palpitations 


GI: ++ vomiting, No diarrhea , No melena_or_BRBPR 


: No dysuria, No hematuria, No discharge 


NEURO: No new_focal_weakness_or_numbness, No confusion, No vision changes


14 point Review of Systems is otherwise negative except per HPI





Physical Exam:


GENERAL: Awake_alert_ nontoxic, mild acute distress Spo2 98% on RA -normal.  

Actively vomiting.


EYES: Extraocular muscles are intact. Conjunctivae clear. Lids without swelling


ENT: External nose and ear normal_in_appearance. Oropharynx clear. 

Head_atraumatic, dry_oral_mucosa


NECK:  No JVD. No meningismus. No thyromegaly.  Supple. Trachea midline


RESP: Normal respiratory effort. Symmetric rise. No stridor. 

Clear_to_auscultation_No_rales_No_wheezes


CARDIAC: Bradycardic and regular rhytm. No_significant pedal edema.


Negative Langford sign.  Negative Rovsing's.  Negative obturator.  Diffuse 

voluntary guarding.  No CVA tenderness to palpation


ABDOMEN: Soft. Nondistended.  Nontender_No_rebound_or_guarding.


MSK:  Normal muscle tone, without rigidity.  Extremities without asymmetric 

deformity or swelling.  


SKIN: Warm and dry.  No visible cyanosis or pallor


NEUROLOGIC: Alert, oriented x3.  Motor_and_sensation_grossly_intact. No truncal 

ataxia. Gait_normal


Psych: Normal mood and affect, normal judgment and insight











- COORDINATION OF CARE


Case was discussed with: Patient  , Patient's Physician





Any labs and imaging that were ordered were interpreted as part of the medical 

decision making:








Medical Decision Making/Plan:





Differential diagnosis includes cholecystitis, choledocholithiasis, hepatitis, 

small bowel obstruction, volvulus, AAA, pancreatitis, atypical appendicitis, 

gastroparesis,  gastritis, peptic ulcer disease, among others.  





Patient is well appearing but is actively retching on examination.  She has 

diffuse voluntary guarding without any evidence of peritonitis.  I did review 

previous medical records.  She was seen here yesterday and the day before.  She 

was discharged with Zofran and Pepcid and instructed to not smoke marijuana 

anymore as this is likely contributing to her intractable nausea and vomiting.  

She states that she has quit, however she is exposed to secondhand smoke where 

she lives.


Labs show new onset thrombocytopenia.  No evidence of TTP.  No evidence of DIC. 

Patient is not actively bleeding anywhere.  Her platelets today are 60, down 

from 180 a few days ago.  She denies history of hematologic abnormality.  Will 

send repeat CBC to confirm.


Patient required several antiemetics here in the emergency department with only 

minimal relief of symptoms.  This is now her third visit in 3 days.  Will admit 

for observation as she has failed outpatient.


CT is negative for acute surgical pathology. 





I spoke with  , and reviewed the patients presentation, workup, 

results, and treatment.  


They will admit the patient for further care and evaluation, and assume care of 

the patient at this time.





The patient denies any bloody stool and has no pain out of proportion to exam, 

and no significant risk factors for mesenteric ischemia such as atrial 

fibrillation or severe PAD/PVD (peripheral arterial / vascular disease), thus 

definitive workup to rule out mesenteric ischemia was not pursued.  


Patient is afebrile, without any significant tenderness in the RUQ, and a 

negative Hostetter sign.  The patients presentation does not appear to be 

consistent with acute cholecystitis and thus definitive imaging to rule it out 

was not pursued.  


The patient has no significant risk factors for AAA (abdominal aortic aneurysm) 

such as age over 50 with history of hypertension, connective tissue disorder, or

1st degree relative with AAA.  the patient has normal dorsalis pedis pulses, no 

radiation of pain to the back, and no pulsatile mass felt on exam.  The 

patients profile was overall low risk for AAA and definitive workup was not 

pursued.


Allergies:  


Uncoded Allergies:  


     PENICILLIN (Allergy, Mild, 11/1/20)





COVID-19 Screening


Contact w/high risk pt:  No


Experienced COVID-19 symptoms?:  No


COVID-19 Testing performed PTA:  No





Nursing Documentation-PMH


Hx Gastrointestinal Problems:  Yes - ULCERS





Physical Exam





Vital Signs








  Date Time  Temp Pulse Resp B/P (MAP) Pulse Ox O2 Delivery O2 Flow Rate FiO2


 


11/3/20 11:21 97.2 58 18 113/69 (84) 99 Room Air  








Sp02 EP Interpretation:  reviewed, normal





Medical Decision Making


Diagnostic Impression:  


   Primary Impression:  


   Abdominal pain


   Additional Impressions:  


   Cannabinoid hyperemesis syndrome


   Vomiting


   Thrombocytopenia


   Marijuana abuse


   Anemia


   Hypokalemia





EKG Diagnostic Results


Troponin ordered:  No


ASA given to the pt in ED:  No





Rhythm Strip Diag. Results


Rhythm Strip Time:  14:01


EP Interpretation:  yes


Rate:  60


Rhythm:  NSR, no PVC's, no ectopy





CT/MRI/US Diagnostic Results


CT/MRI/US Diagnostic Results :  


   Impression


CT abdomen and pelvis preliminary read


Preliminary read


Normal appendix.  Small amount of pelvic free fluid, probably physiologic.  No 

acute disease.


Impression: Normal


Written by Nik Garcia MD 11/3/2020


Reevaluation Time:  14:01





Last Vital Signs








  Date Time  Temp Pulse Resp B/P (MAP) Pulse Ox O2 Delivery O2 Flow Rate FiO2


 


11/3/20 13:05 97.2       


 


11/3/20 12:56  60 14 122/81 99 Room Air  








Status:  improved


Disposition:  ADMITTED AS INPATIENT


Admit Decision Time:  14:01


Condition:  Stable


Referrals:  


NOT CHOSEN IPA/MD,REFERRING (PCP)











Norma Dewitt D.O.            Nov 3, 2020 13:38

## 2020-11-03 NOTE — NUR
NURSE NOTES:

Received report from DEDRA Martinez. Rounds done, patient alert, oriented. No complaints of 
pain or nausea at this time. Tolerating clear liquid diet. IV site intact, infusing 
IVF.Encouraged patient to call as needed for assistance. Bed is in low position, locked, 
side rails up x2, call light within reach. Will continue to monitor.

## 2020-11-03 NOTE — NUR
ED Nurse Note:



IV line was established on right AC 20ga, blood and urine specinens were 
colected sent to lab

## 2020-11-03 NOTE — NUR
NURSE NOTES:

PATIENT RECEIVED ER DEPT. VIA [facility]. AOX4. AMBULATED TO BED. GUARDING ABDOMEN. C/O MILD 
PAIN. MEDS GIVEN IN ER HELPED TO SUBSIDE MOST OF HER PAIN. PATIENT ORIENTED TO ROOM. 
BELONGINGS ACCOUNTED FOR AND CHECKED WITH ER RN AT BEDSIDE. WILL PLACE CALL TO ADMITTING MD 
FOR ORDERS. BED IN LOW AND LOCKED POSITION. CALL LIGHT WITHIN REACH.

## 2020-11-03 NOTE — HISTORY AND PHYSICAL REPORT
DATE OF ADMISSION:  11/03/2020

TIME SEEN:  2 p.m.



CONSULTATIONS:

1. Lewis Barnett MD.

2. Dr. Fontenot.



CHIEF COMPLAINT:  Abdominal pain, vomiting, thrombocytopenia.



BRIEF HISTORY:  This is a 35-year-old female, who lives at home, presents

with three days of increasing abdominal pain, nausea, and vomiting.  The

patient does not know if she ate anything in the past, came to [facility],

diagnosed as above, being admitted to medical floor.  Currently, slightly

anxious in bed, slightly nausea and vomiting.  No complaint.



REVIEW OF SYSTEMS:  No chest pain.  No shortness of breath.  Slight nausea

and vomiting.  No diarrhea.



PAST MEDICAL HISTORY:  Nothing.



PAST SURGICAL HISTORY:  Nothing.



ALLERGIES:  Penicillin.



MEDICATIONS:  Zofran, morphine, _____, potassium, Haldol.



SOCIAL HISTORY:  No smoking.  No alcohol.  No intravenous drug abuse.

Positive marijuana.



PHYSICAL EXAMINATION:

GENERAL:  Calm in bed, oriented x3, in no acute distress.

VITAL SIGNS:  Temperature is 97 degrees, pulse 58, respiratory rate 18,

blood pressure 122/81.

CARDIOVASCULAR:  No murmur.

LUNGS:  Distant and clear.

ABDOMEN:  Bowel sounds positive.  Nontender.  Nondistended.

EXTREMITIES:  No cyanosis, clubbing, or edema.

NEUROLOGIC:  The patient moves all extremities, slightly weak.



LABORATORY AND DIAGNOSTIC DATA:  Labs at this time show hemoglobin and

hematocrit 10/36, platelets 60.  BMP show potassium 3.0, BUN 5, otherwise

normal.  ALT is 9.  Lipase 226.  Urinalysis, 4+ ketone, moderate bacteria.

Urine tox positive marijuana.



ASSESSMENT:

1. Abdominal pain.

2. Vomiting.

3. Thrombocytopenia.

4. Anemia.

5. Hypokalemia.



PLAN:

1. NPO.

2. IV fluids.

3. Zofran and pain control.

4. Replace potassium.

5. Resume home medications.

6. PT and dietary evaluation.

7. CBC and BMP in the morning.









  ______________________________________________

  Santana Aviles D.O.





DR:  MONROE

D:  11/03/2020 14:24

T:  11/03/2020 16:27

JOB#:  [id number]

CC:

## 2020-11-03 NOTE — NUR
NURSE HAND-OFF: 



Important Events on Shift:N/A

Patient Status: STABLE

Diet: CLEAR



Pending Orders: CT ABD/PELVIS  DONE IN ER.

Pending Results/Labs:[]

Pending MD notification:N/A



Latest Vital Signs: Temperature 98.4 , Pulse 57 , B/P 107 /64 , Respiratory Rate 18 , O2 SAT 
98 , Room Air, O2 Flow Rate .  

Vital Sign Comment: STABLE



Latest Leigh Fall Score: 35  

Fall Risk: Medium Risk 

Safety Measures: Call light Within Reach, Bed Alarm , Side Rails Side Rails x2, Bed position 
Low and Locked.

Fall Precautions: 

Yellow Socks

Door Sign

Patient Fall Education



Report given to JULIO FLORES RN .

## 2020-11-03 NOTE — NUR
ED Nurse Note:



Patient was admited to MS unit due to intractable abdominal pain, and 
thrombocytopenia. Patient was transfered to the unit via gurney, with all 
belongings. Patient AAO x4, VDSS at this time, pain 3/10.

## 2020-11-04 VITALS — DIASTOLIC BLOOD PRESSURE: 67 MMHG | SYSTOLIC BLOOD PRESSURE: 104 MMHG

## 2020-11-04 VITALS — DIASTOLIC BLOOD PRESSURE: 56 MMHG | SYSTOLIC BLOOD PRESSURE: 111 MMHG

## 2020-11-04 VITALS — DIASTOLIC BLOOD PRESSURE: 78 MMHG | SYSTOLIC BLOOD PRESSURE: 120 MMHG

## 2020-11-04 VITALS — SYSTOLIC BLOOD PRESSURE: 97 MMHG | DIASTOLIC BLOOD PRESSURE: 65 MMHG

## 2020-11-04 VITALS — SYSTOLIC BLOOD PRESSURE: 111 MMHG | DIASTOLIC BLOOD PRESSURE: 69 MMHG

## 2020-11-04 VITALS — DIASTOLIC BLOOD PRESSURE: 70 MMHG | SYSTOLIC BLOOD PRESSURE: 109 MMHG

## 2020-11-04 LAB
% IRON SATURATION: 2 % (ref 15–50)
ADD MANUAL DIFF: NO
ADD MANUAL DIFF: YES
ALBUMIN SERPL-MCNC: 3.1 G/DL (ref 3.4–5)
ALBUMIN/GLOB SERPL: 0.9 {RATIO} (ref 1–2.7)
ALP SERPL-CCNC: 46 U/L (ref 46–116)
ALT SERPL-CCNC: 7 U/L (ref 12–78)
AMYLASE SERPL-CCNC: 46 U/L (ref 25–115)
ANION GAP SERPL CALC-SCNC: 7 MMOL/L (ref 5–15)
AST SERPL-CCNC: 15 U/L (ref 15–37)
BASOPHILS NFR BLD AUTO: 1.6 % (ref 0–2)
BILIRUB SERPL-MCNC: 0.3 MG/DL (ref 0.2–1)
BUN SERPL-MCNC: 2 MG/DL (ref 7–18)
CALCIUM SERPL-MCNC: 8 MG/DL (ref 8.5–10.1)
CHLORIDE SERPL-SCNC: 106 MMOL/L (ref 98–107)
CO2 SERPL-SCNC: 27 MMOL/L (ref 21–32)
CREAT SERPL-MCNC: 0.8 MG/DL (ref 0.55–1.3)
EOSINOPHIL NFR BLD AUTO: 0.6 % (ref 0–3)
ERYTHROCYTE [DISTWIDTH] IN BLOOD BY AUTOMATED COUNT: 17 % (ref 11.6–14.8)
ERYTHROCYTE [DISTWIDTH] IN BLOOD BY AUTOMATED COUNT: 17.1 % (ref 11.6–14.8)
FERRITIN SERPL-MCNC: 7 NG/ML (ref 8–388)
GLOBULIN SER-MCNC: 3.4 G/DL
HCT VFR BLD CALC: 30.8 % (ref 37–47)
HCT VFR BLD CALC: 32.2 % (ref 37–47)
HGB BLD-MCNC: 8.7 G/DL (ref 12–16)
HGB BLD-MCNC: 9.2 G/DL (ref 12–16)
IRON SERPL-MCNC: 9 UG/DL (ref 50–175)
LDH SERPL L TO P-CCNC: 189 U/L (ref 81–234)
LYMPHOCYTES NFR BLD AUTO: 37.6 % (ref 20–45)
MCV RBC AUTO: 78 FL (ref 80–99)
MCV RBC AUTO: 79 FL (ref 80–99)
MONOCYTES NFR BLD AUTO: 13.2 % (ref 1–10)
NEUTROPHILS NFR BLD AUTO: 47.1 % (ref 45–75)
PLATELET # BLD: 143 K/UL (ref 150–450)
PLATELET # BLD: 23 K/UL (ref 150–450)
POTASSIUM SERPL-SCNC: 3 MMOL/L (ref 3.5–5.1)
RBC # BLD AUTO: 3.92 M/UL (ref 4.2–5.4)
RBC # BLD AUTO: 4.11 M/UL (ref 4.2–5.4)
SODIUM SERPL-SCNC: 140 MMOL/L (ref 136–145)
TIBC SERPL-MCNC: 372 UG/DL (ref 250–450)
UNSATURATED IRON BINDING: 363 UG/DL (ref 112–346)
WBC # BLD AUTO: 10.2 K/UL (ref 4.8–10.8)
WBC # BLD AUTO: 5.4 K/UL (ref 4.8–10.8)

## 2020-11-04 RX ADMIN — DEXTROSE, SODIUM CHLORIDE, AND POTASSIUM CHLORIDE SCH MLS/HR: 5; .45; .075 INJECTION INTRAVENOUS at 18:38

## 2020-11-04 RX ADMIN — SODIUM CHLORIDE SCH MLS/HR: 0.9 INJECTION INTRAVENOUS at 20:54

## 2020-11-04 RX ADMIN — DEXTROSE, SODIUM CHLORIDE, AND POTASSIUM CHLORIDE SCH MLS/HR: 5; .45; .075 INJECTION INTRAVENOUS at 06:00

## 2020-11-04 RX ADMIN — LACTULOSE SCH GM: 20 SOLUTION ORAL at 09:54

## 2020-11-04 NOTE — DIAGNOSTIC IMAGING REPORT
Indication: Abdominal distention and pain

 

Technique: Gray-scale and duplex images of the upper abdomen were obtained

 

Comparison: Reference made to abdomen pelvis CT 11/3/2020

 

Findings: Gallbladder is unremarkable. No gallstones or gallbladder wall thickening 

No gallbladder wall thickening or pericholecystic fluid  Common bile duct measures 3

mm in diameter.  No intrahepatic biliary ductal dilatation.  Liver demonstrates

normal echogenicity, no focal abnormality.   Portal vein and hepatic veins are

patent.   Pancreas is unremarkable.    Spleen is unremarkable.  Left kidney measures

11 cm in length.  Right kidney measures 9.9 cm length.  Both kidneys demonstrate

normal echogenicity.  There is no hydronephrosis.   No focal abnormality .

Non-aneurysmal abdominal aorta .

 

Impression: Negative

## 2020-11-04 NOTE — CONSULTATION
History of Present Illness


General


Chief Complaint:  Abdominal Pain





Present Illness


Allergies:  


Uncoded Allergies:  


     PENICILLIN (Allergy, Mild, 11/1/20)





Medication History


Scheduled


Famotidine* (Pepcid 20mg tablet*), 20 MG ORAL DAILY, (Reported)


Omeprazole (Omeprazole), 40 MG ORAL DAILY





Scheduled PRN


Ondansetron (Zofran), 4 MG ORAL Q6H PRN for Nausea & Vomiting


Ondansetron* (Zofran*), 4 MG ORAL Q6H PRN for Nausea & Vomiting, (Reported)





Patient History


Healthcare decision maker


N


Resuscitation status





Advanced Directive on File








Physical Exam





Last 24 Hour Vital Signs








  Date Time  Temp Pulse Resp B/P (MAP) Pulse Ox O2 Delivery O2 Flow Rate FiO2


 


11/4/20 04:00 98.1 61 15 109/70 (83) 99   


 


11/4/20 00:00 97.9 66 16 111/56 (74) 98   


 


11/3/20 21:00      Room Air  


 


11/3/20 20:00 97.8 57 16 107/59 (75) 98   


 


11/3/20 15:38      Room Air  


 


11/3/20 15:10 98.4 57 18 107/64 (78) 98   


 


11/3/20 15:08 97.2 60 14 122/81 99 Room Air  


 


11/3/20 14:17 97.2       


 


11/3/20 13:05 97.2       


 


11/3/20 12:56  60 14 122/81 99 Room Air  


 


11/3/20 11:40 97.2 87 18 113/69 99 Room Air  


 


11/3/20 11:40  58 18   Room Air  


 


11/3/20 11:21 97.2 58 18 113/69 (84) 99 Room Air  

















Intake and Output  


 


 11/3/20 11/4/20





 19:00 07:00


 


Intake Total 195 ml 480 ml


 


Balance 195 ml 480 ml


 


  


 


Intake Oral 120 ml 480 ml


 


IV Total 75 ml 


 


# Voids 2 3











Laboratory Tests








Test


 11/3/20


11:52 11/3/20


14:27 11/4/20


05:21


 


White Blood Count


 10.7 K/UL


(4.8-10.8) 4.8 K/UL


(4.8-10.8)  # 10.2 K/UL


(4.8-10.8)  #


 


Red Blood Count


 4.67 M/UL


(4.20-5.40) 4.01 M/UL


(4.20-5.40)  L 3.92 M/UL


(4.20-5.40)  L


 


Hemoglobin


 10.3 G/DL


(12.0-16.0)  L 8.8 G/DL


(12.0-16.0)  L 8.7 G/DL


(12.0-16.0)  L


 


Hematocrit


 36.9 %


(37.0-47.0)  L 31.9 %


(37.0-47.0)  L 30.8 %


(37.0-47.0)  L


 


Mean Corpuscular Volume


 79 FL (80-99)


L 80 FL (80-99)  


 79 FL (80-99)


L


 


Mean Corpuscular Hemoglobin


 22.1 PG


(27.0-31.0)  L 22.0 PG


(27.0-31.0)  L 22.2 PG


(27.0-31.0)  L


 


Mean Corpuscular Hemoglobin


Concent 28.0 G/DL


(32.0-36.0)  L 27.6 G/DL


(32.0-36.0)  L 28.2 G/DL


(32.0-36.0)  L


 


Red Cell Distribution Width


 17.4 %


(11.6-14.8)  H 17.7 %


(11.6-14.8)  H 17.1 %


(11.6-14.8)  H


 


Platelet Count


 60 K/UL


(150-450)  #L 147 K/UL


(150-450)  #L 23 K/UL


(150-450)  #L


 


Mean Platelet Volume


 15.6 FL


(6.5-10.1)  H 11.8 FL


(6.5-10.1)  H 12.0 FL


(6.5-10.1)  H


 


Neutrophils (%) (Auto)


 % (45.0-75.0)


 68.3 %


(45.0-75.0) % (45.0-75.0)





 


Lymphocytes (%) (Auto)


 % (20.0-45.0)


 13.9 %


(20.0-45.0)  L % (20.0-45.0)





 


Monocytes (%) (Auto)


  % (1.0-10.0)  


 13.1 %


(1.0-10.0)  H  % (1.0-10.0)  





 


Eosinophils (%) (Auto)


  % (0.0-3.0)  


 0.0 %


(0.0-3.0)  % (0.0-3.0)  





 


Basophils (%) (Auto)


  % (0.0-2.0)  


 4.7 %


(0.0-2.0)  H  % (0.0-2.0)  





 


Differential Total Cells


Counted 100  


 


 





 


Neutrophils % (Manual) 69 % (45-75)    Pending  


 


Lymphocytes % (Manual) 25 % (20-45)    Pending  


 


Monocytes % (Manual) 6 % (1-10)    


 


Eosinophils % (Manual) 0 % (0-3)    


 


Basophils % (Manual) 0 % (0-2)    


 


Band Neutrophils 0 % (0-8)    


 


Platelet Estimate Adequate    Pending  


 


Platelet Morphology See comment    Pending  


 


Clumped Platelets 3+    


 


Hypochromasia 2+    


 


Microcytosis 2+    


 


Urine Color Pale yellow    


 


Urine Appearance


 Slightly


cloudy 


 





 


Urine pH 6 (4.5-8.0)    


 


Urine Specific Gravity


 1.015


(1.005-1.035) 


 





 


Urine Protein


 Negative


(NEGATIVE) 


 





 


Urine Glucose (UA)


 Negative


(NEGATIVE) 


 





 


Urine Ketones


 4+ (NEGATIVE)


H 


 





 


Urine Blood


 Negative


(NEGATIVE) 


 





 


Urine Nitrite


 Negative


(NEGATIVE) 


 





 


Urine Bilirubin


 Negative


(NEGATIVE) 


 





 


Urine Urobilinogen


 Normal MG/DL


(0.0-1.0) 


 





 


Urine Leukocyte Esterase


 Negative


(NEGATIVE) 


 





 


Urine RBC


 0 /HPF (0 - 2)


 


 





 


Urine WBC


 0 /HPF (0 - 2)


 


 





 


Urine Squamous Epithelial


Cells Few /LPF


(NONE/OCC) 


 





 


Urine Bacteria


 Moderate /HPF


(NONE)  H 


 





 


Sodium Level


 139 MMOL/L


(136-145) 


 140 MMOL/L


(136-145)


 


Potassium Level


 3.0 MMOL/L


(3.5-5.1)  L 


 3.0 MMOL/L


(3.5-5.1)  L


 


Chloride Level


 101 MMOL/L


() 


 106 MMOL/L


()


 


Carbon Dioxide Level


 22 MMOL/L


(21-32) 


 27 MMOL/L


(21-32)


 


Anion Gap


 16 mmol/L


(5-15)  H 


 7 mmol/L


(5-15)


 


Blood Urea Nitrogen


 5 mg/dL (7-18)


L 


 2 mg/dL (7-18)


L


 


Creatinine


 0.9 MG/DL


(0.55-1.30) 


 0.8 MG/DL


(0.55-1.30)


 


Estimat Glomerular Filtration


Rate > 60 mL/min


(>60) 


 > 60 mL/min


(>60)


 


Glucose Level


 83 MG/DL


() 


 96 MG/DL


()


 


Calcium Level


 8.9 MG/DL


(8.5-10.1) 


 8.0 MG/DL


(8.5-10.1)  L


 


Total Bilirubin


 0.6 MG/DL


(0.2-1.0) 


 0.3 MG/DL


(0.2-1.0)


 


Aspartate Amino Transf


(AST/SGOT) 16 U/L (15-37)


 


 15 U/L (15-37)





 


Alanine Aminotransferase


(ALT/SGPT) 9 U/L (12-78)


L 


 7 U/L (12-78)


L


 


Alkaline Phosphatase


 60 U/L


() 


 46 U/L


()


 


Total Protein


 8.5 G/DL


(6.4-8.2)  H 


 6.5 G/DL


(6.4-8.2)


 


Albumin


 4.2 G/DL


(3.4-5.0) 


 3.1 G/DL


(3.4-5.0)  L


 


Globulin 4.3 g/dL    3.4 g/dL  


 


Albumin/Globulin Ratio


 1.0 (1.0-2.7)  


 


 0.9 (1.0-2.7)


L


 


Lipase


 226 U/L


() 


 





 


Urine Opiates Screen


 Negative


(NEGATIVE) 


 





 


Urine Barbiturates Screen


 Negative


(NEGATIVE) 


 





 


Phencyclidine (PCP) Screen


 Negative


(NEGATIVE) 


 





 


Urine Amphetamines Screen


 Negative


(NEGATIVE) 


 





 


Urine Benzodiazepines Screen


 Negative


(NEGATIVE) 


 





 


Urine Cocaine Screen


 Negative


(NEGATIVE) 


 





 


Urine Marijuana (THC) Screen


 Positive


(NEGATIVE)  H 


 





 


Iron Level   Pending  


 


Unsaturated Iron Binding   Pending  


 


Amylase Level   Pending  


 


Vitamin B12 Level   Pending  


 


Folate   Pending  








Height (Feet):  5


Height (Inches):  7.00


Weight (Pounds):  174


Medications





Current Medications








 Medications


  (Trade)  Dose


 Ordered  Sig/Shwetha


 Route


 PRN Reason  Start Time


 Stop Time Status Last Admin


Dose Admin


 


 Dextrose/


 Electrolytes  1,000 ml @ 


 75 mls/hr  [id number]


 IV


   11/3/20 17:00


 12/3/20 16:59  11/3/20 17:48





 


 Iohexol


  (OMNIPAQUE-300


 100ml)  100 ml  NOW  PRN


 INJ


 Radiology Procedure  11/3/20 13:15


 11/5/20 13:14   





 


 Lactulose


  (Cephulac)  30 gm  BID


 ORAL


   11/3/20 18:00


 12/3/20 17:59  11/3/20 17:47





 


 Morphine Sulfate


  (Morphine


 Sulfate)  2 mg  Q4H  PRN


 IVP


 For Pain  11/3/20 16:30


 11/10/20 16:29   





 


 Ondansetron HCl


  (Zofran)  4 mg  Q4H  PRN


 IVP


 Nausea & Vomiting  11/3/20 16:30


 12/3/20 16:29   














Assessment/Plan


Assessment/Plan:


Hematology Consultation





REQ MD: KAT Aviles


RFC: Thrombocytopenia


DOS: 11/4/2020





HPI


35-year-old female with past medical history of marijuana use presents with 

chief complaint of diffuse abdominal pain.  She was seen yesterday and the day 

before in the emergency department.  She states that she has been compliant with

the medication she was prescribed.  She has been taking Zofran and Pepcid 

without relief of her abdominal pain, nausea, and vomiting.  She denies melena, 

hematochezia, dysuria, hematuria, fall, trauma, fever, chest pain, shortness of 

breath, cough or any other symptoms. The patient's symptoms were gradual onset, 

severity was moderate, duration since 3 days.  She states she last smoked 

marijuana several weeks ago, however there is secondhand smoke in her house and 

has been exposed for the past several days..  Plts now 147 to 23k. also noted to

ahve anemia





Past medical history: Marijuana use


Past surgical history:  Denies





Smoking:  Denies


Alcohol use:  Denies


Drug use: ++ Marijuana 





Review of systems:


CONST: No fevers ++ chills, No night sweats


PULMONARY: No productive cough,  No shortness of breath 


CARDIAC: No chest pain, No palpitations 


GI: ++ vomiting, No diarrhea , No melena_or_BRBPR 


: No dysuria, No hematuria, No discharge 


NEURO: No new_focal_weakness_or_numbness, No confusion, No vision changes


14 point Review of Systems is otherwise negative except per HPI





Physical Exam:


GENERAL: Awake alert


HEENT: Extraocular muscles are intact. 


RESP: Normal respiratory effort. Symmetric rise. No stridor. Ctab


CARDIAC: Bradycardic and regular rhytm. No_significant pedal edema.


Negative Langford sign.  Negative Rovsing's.  


ABDOMEN: Soft. Nondistended.  


MSK:  Normal muscle tone, without rigidity. 


SKIN: Warm and dry.  No visible cyanosis


NEUROLOGIC: Alert, oriented x3. 


Psych: Normal mood and affect





Labs: reviewed





Imaging: noted





Assessment and Resc


# Thrombocytopenia acute onset, this is first time she has been admitted, no 

thrombocytopenia history in the past, no recent ingestion, no recent meds, no 

spont abortions


--> hep and hiv ordered


--> us abd ordered


--> if above neg and repeat low plts start steriods


--> r/o DIC as well


--> meds reviewed


--> 180-->60-->23


# Anemia r/o iron deficiency


--> anemia panel ordered


--> transf prn


-> per gi eval


# Hypokalemia


--> replete with k


# Abd pain


-> Zofran and Pepcid


--> do no smoke thc


# Dvt ppx scds





Appreciate consultation and dw Master Barcenas MD           Nov 4, 2020 06:45

## 2020-11-04 NOTE — DIAGNOSTIC IMAGING REPORT
Clinical Indication: Abdominal pain

 

Technique:   No oral contrast utilized, per emergency room physician request  IV

administration nonionic contrast. Venous phase spiral acquisition obtained through

the abdomen and pelvis. Multiplanar reconstructions were generated. Total dose length

product 345 mGycm. CTDIvol(s) 6 mGy. Dose reduction achieved using automated exposure

control

 

 

Comparison: none

 

Findings: Normal appendix. No evidence of diverticulosis or diverticulitis. No small

bowel distention. No free or loculated intraperitoneal gas. Trace fluid is seen

within the pelvis. The distal esophagus, stomach, duodenum are unremarkable.

 

The liver, gallbladder, bile ducts, pancreas, spleen, adrenals, kidneys are

unremarkable. No retroperitoneal or mesenteric mass or adenopathy. No pelvic mass or

adenopathy.

 

The included lung bases are clear. The bones are unremarkable.

 

Impression: No acute abnormality

 

Trace free pelvic fluid, likely physiologic

 

 

 

 

 

 

 

 

 

The CT scanner at [facility] is accredited by the American College of

Radiology and the scans are performed using protocols designed to limit radiation

exposure to as low as reasonably achievable to attain images of sufficient resolution

adequate for diagnostic evaluation.

## 2020-11-04 NOTE — NUR
NURSE NOTES:



Received report from DEDRA Leonard. Rounding done with night shift nurse. Pt a/o x4, in bed. No 
discomfort noted. Denies any pain, N/V at this time. Discussed POC specially US of ABD. 
Instructed NPO again. Bed in lowest position, call light within reach. Will continue to 
monitor.

## 2020-11-04 NOTE — NUR
NURSE HAND-OFF: 



IImportant Events on Shift: stool sample sent to lab this morning, Dr Fontenot saw patient, 
new orders, 

Patient Status: 

Diet: NPO



Pending Orders: repeat CBC, abdominal ultrasound

Pending Results/Labs:[]

Pending MD notification: Dr Fontenot aware of CBC results



Latest Vital Signs: Temperature 98.1 , Pulse 61 , B/P 109 /70 , Respiratory Rate 15 , O2 SAT 
99 , Room Air, O2 Flow Rate .  

Vital Sign Comment: []



Latest Leigh Fall Score: 35  

Fall Risk: Medium Risk 

Safety Measures: Call light Within Reach, Bed Alarm Zone 1, Side Rails Side Rails x2, Bed 
position Low and Locked.

Fall Precautions: 

Patient Fall Education



Report given to DEDRA Pino.

-------------------------------------------------------------------------------

Addendum: 11/04/20 at 0754 by Aisha Mejia RN

-------------------------------------------------------------------------------

0745: report given to DEDRA Pino

## 2020-11-04 NOTE — NUR
NURSE HAND-OFF: 



Important Events on Shift:

Patient Status: c/o epigastric pain

Diet: full liquid



Pending Orders: 

Pending Results/Labs:

Pending MD notification:



Latest Vital Signs: Temperature 98.6 , Pulse 56 , B/P 111 /69 , Respiratory Rate 15 , O2 SAT 
99 , Room Air, O2 Flow Rate .  

Vital Sign Comment: stable



Latest Leigh Fall Score: 35  

Fall Risk: Medium Risk 

Safety Measures: Call light Within Reach, Bed Alarm Zone 1, Side Rails Side Rails x2, Bed 
position Low and Locked.

Fall Precautions: 

Patient Fall Education



Report given to DEDRA Campos. Pt c/o epigastric pain and endorsed to night shift nurse.

## 2020-11-04 NOTE — NUR
NURSE NOTES:

Receive a report from DEDRA Pino. Round is done. Pt is awake but developed epigastric pain. Pt 
has got Morphine 2mg IVS 1736. Pt did not eat full liquid diet for dinner. Had 3 x BM so 
far. Noted BS. IV hydration on Rt AC. Will notify MD. Call light within reach. Will continue 
to [facility].

## 2020-11-04 NOTE — NUR
CASE MANAGEMENT:INITIAL REVIEW



35 YR OLD FEMALE FROM HOME



CC;ABDOMINAL PAIN



SI;INTRACTABLE ABD PAIN. THROMBOCYTOPENIA. ANEMIA.

THC ABUSE. CANNABINOID HYPEREMESIS SYNDROME.

97.2   58   18   122/81   99% ON RA

PLT 60   K+ 3.0   ANION GAP 16   

UA+ KETONES, BACTERIA

URINE TOX+ THC

STOOL OB ~ NEGATIVE 

ABD US ~ NEGATIVE



IS;ROCEPHIN IV 

MORPHINE IV

ZOFRAN IV

K-DUR PO

MAG SULFATE IV

HALDOL IM ONCE



****ADMITTED TO MED SURG*****

****MED SURG STATUS****

DCP;FROM HOME

## 2020-11-04 NOTE — GENERAL PROGRESS NOTE
Subjective


ROS Limited/Unobtainable:  Yes


Allergies:  


Uncoded Allergies:  


     PENICILLIN (Allergy, Mild, 11/1/20)





Objective





Last 24 Hour Vital Signs








  Date Time  Temp Pulse Resp B/P (MAP) Pulse Ox O2 Delivery O2 Flow Rate FiO2


 


11/4/20 08:00 97.9 58 15 97/65 (76) 98   


 


11/4/20 04:00 98.1 61 15 109/70 (83) 99   


 


11/4/20 00:00 97.9 66 16 111/56 (74) 98   


 


11/3/20 21:00      Room Air  


 


11/3/20 20:00 97.8 57 16 107/59 (75) 98   


 


11/3/20 15:38      Room Air  


 


11/3/20 15:10 98.4 57 18 107/64 (78) 98   


 


11/3/20 15:08 97.2 60 14 122/81 99 Room Air  


 


11/3/20 14:17 97.2       


 


11/3/20 13:05 97.2       


 


11/3/20 12:56  60 14 122/81 99 Room Air  


 


11/3/20 11:40 97.2 87 18 113/69 99 Room Air  


 


11/3/20 11:40  58 18   Room Air  


 


11/3/20 11:21 97.2 58 18 113/69 (84) 99 Room Air  

















Intake and Output  


 


 11/3/20 11/4/20





 19:00 07:00


 


Intake Total 195 ml 480 ml


 


Output Total  750 ml


 


Balance 195 ml -270 ml


 


  


 


Intake Oral 120 ml 480 ml


 


IV Total 75 ml 


 


Output Stool Total  750 ml


 


# Voids 2 3


 


# Bowel Movements  1








Laboratory Tests


11/3/20 11:52: 


White Blood Count 10.7, Red Blood Count 4.67, Hemoglobin 10.3L, Hematocrit 36.9L

, Mean Corpuscular Volume 79L, Mean Corpuscular Hemoglobin 22.1L, Mean 

Corpuscular Hemoglobin Concent 28.0L, Red Cell Distribution Width 17.4H, 

Platelet Count 60#L, Mean Platelet Volume 15.6H, Neutrophils (%) (Auto) , 

Lymphocytes (%) (Auto) , Monocytes (%) (Auto) , Eosinophils (%) (Auto) , 

Basophils (%) (Auto) , Differential Total Cells Counted 100, Neutrophils % 

(Manual) 69, Lymphocytes % (Manual) 25, Monocytes % (Manual) 6, Eosinophils % 

(Manual) 0, Basophils % (Manual) 0, Band Neutrophils 0, Platelet Estimate 

Adequate, Platelet Morphology See comment, Clumped Platelets 3+, Hypochromasia 

2+, Microcytosis 2+, Urine Color Pale yellow, Urine Appearance Slightly cloudy, 

Urine pH 6, Urine Specific Gravity 1.015, Urine Protein Negative, Urine Glucose 

(UA) Negative, Urine Ketones 4+H, Urine Blood Negative, Urine Nitrite Negative, 

Urine Bilirubin Negative, Urine Urobilinogen Normal, Urine Leukocyte Esterase 

Negative, Urine RBC 0, Urine WBC 0, Urine Squamous Epithelial Cells Few, Urine 

Bacteria ModerateH, Sodium Level 139, Potassium Level 3.0L, Chloride Level 101, 

Carbon Dioxide Level 22, Anion Gap 16H, Blood Urea Nitrogen 5L, Creatinine 0.9, 

Estimat Glomerular Filtration Rate > 60, Glucose Level 83, Calcium Level 8.9, 

Total Bilirubin 0.6, Aspartate Amino Transf (AST/SGOT) 16, Alanine 

Aminotransferase (ALT/SGPT) 9L, Alkaline Phosphatase 60, Total Protein 8.5H, 

Albumin 4.2, Globulin 4.3, Albumin/Globulin Ratio 1.0, Lipase 226, Urine Opiates

Screen Negative, Urine Barbiturates Screen Negative, Phencyclidine (PCP) Screen 

Negative, Urine Amphetamines Screen Negative, Urine Benzodiazepines Screen 

Negative, Urine Cocaine Screen Negative, Urine Marijuana (THC) Screen PositiveH


11/3/20 14:27: 


White Blood Count 4.8#, Red Blood Count 4.01L, Hemoglobin 8.8L, Hematocrit 31.9L

, Mean Corpuscular Volume 80, Mean Corpuscular Hemoglobin 22.0L, Mean 

Corpuscular Hemoglobin Concent 27.6L, Red Cell Distribution Width 17.7H, 

Platelet Count 147#L, Mean Platelet Volume 11.8H, Neutrophils (%) (Auto) 68.3, 

Lymphocytes (%) (Auto) 13.9L, Monocytes (%) (Auto) 13.1H, Eosinophils (%) (Auto)

0.0, Basophils (%) (Auto) 4.7H


11/4/20 05:21: 


White Blood Count 10.2#, Red Blood Count 3.92L, Hemoglobin 8.7L, Hematocrit 

30.8L, Mean Corpuscular Volume 79L, Mean Corpuscular Hemoglobin 22.2L, Mean 

Corpuscular Hemoglobin Concent 28.2L, Red Cell Distribution Width 17.1H, 

Platelet Count 23#L, Mean Platelet Volume 12.0H, Neutrophils (%) (Auto) , 

Lymphocytes (%) (Auto) , Monocytes (%) (Auto) , Eosinophils (%) (Auto) , 

Basophils (%) (Auto) , Differential Total Cells Counted 100, Neutrophils % 

(Manual) 45, Lymphocytes % (Manual) 40, Monocytes % (Manual) 13H, Eosinophils % 

(Manual) 0, Basophils % (Manual) 2, Band Neutrophils 0, Platelet Estimate 

DecreasedL, Platelet Morphology Normal, Hypochromasia 2+, Microcytosis 1+, 

Sodium Level 140, Potassium Level 3.0L, Chloride Level 106, Carbon Dioxide Level

27, Anion Gap 7, Blood Urea Nitrogen 2L, Creatinine 0.8, Estimat Glomerular 

Filtration Rate > 60, Glucose Level 96, Calcium Level 8.0L, Total Bilirubin 0.3,

Aspartate Amino Transf (AST/SGOT) 15, Alanine Aminotransferase (ALT/SGPT) 7L, 

Alkaline Phosphatase 46, Total Protein 6.5, Albumin 3.1L, Globulin 3.4, 

Albumin/Globulin Ratio 0.9L, Anisocytosis 1+, Ovalocytes Occasional, Iron Level 

9L, Total Iron Binding Capacity 372, Percent Iron Saturation 2L, Unsaturated 

Iron Binding 363H, Ferritin 7L, Lactate Dehydrogenase 189, Amylase Level 46, 

Vitamin B12 Level 255, Folate 13.2, HIV (1&2) Antibody Rapid Negative


11/4/20 06:46: Stool Occult Blood [Pending]


11/4/20 08:25: 


White Blood Count 5.4, Red Blood Count 4.11L, Hemoglobin 9.2L, Hematocrit 32.2L,

 Mean Corpuscular Volume 78L, Mean Corpuscular Hemoglobin 22.3L, Mean 

Corpuscular Hemoglobin Concent 28.5L, Red Cell Distribution Width 17.0H, 

Platelet Count 143#L, Mean Platelet Volume 12.0H, Neutrophils (%) (Auto) 47.1, 

Lymphocytes (%) (Auto) 37.6, Monocytes (%) (Auto) 13.2H, Eosinophils (%) (Auto) 

0.6, Basophils (%) (Auto) 1.6, Hepatitis A IgM Antibody [Pending], Hepatitis B 

Surface Antigen [Pending], Hepatitis B Core IgM Antibody [Pending], Hepatitis C 

Antibody [Pending]


Height (Feet):  5


Height (Inches):  7.00


Weight (Pounds):  174


General Appearance:  alert


EENT:  PERRL/EOMI


Neck:  supple


Cardiovascular:  normal rate


Respiratory/Chest:  decreased breath sounds


Abdomen:  normal bowel sounds, non tender, soft


Extremities:  non-tender





Assessment/Plan


Problem List:  


(1) Cannabinoid hyperemesis syndrome


ICD Codes:  R11.2 - Nausea with vomiting, unspecified; F12.90 - Cannabis use, 

unspecified, uncomplicated


SNOMED:  339898697, 921230615


(2) Vomiting


ICD Codes:  R11.10 - Vomiting, unspecified


SNOMED:  386317839


(3) Abdominal pain


ICD Codes:  R10.9 - Unspecified abdominal pain


SNOMED:  29570422


(4) Thrombocytopenia


ICD Codes:  D69.6 - Thrombocytopenia, unspecified


SNOMED:  913854358


(5) Anemia


ICD Codes:  D64.9 - Anemia, unspecified


SNOMED:  106579629


(6) Hypokalemia


ICD Codes:  E87.6 - Hypokalemia


SNOMED:  48878507


Status:  unchanged


Assessment/Plan:


IVF


pain control


control nausea


anemia work up>>> irob def>>> add venofer>>> fu stool ob


replace K


repeat labs


advance diet to full liquid


avoid THC











Lewis Barnett MD              Nov 4, 2020 10:10

## 2020-11-04 NOTE — GENERAL PROGRESS NOTE
Subjective


Constitutional:  Reports: weakness


Allergies:  


Uncoded Allergies:  


     PENICILLIN (Allergy, Mild, 11/1/20)


All Systems:  reviewed and negative except above


Subjective


calm in bed





Objective





Last 24 Hour Vital Signs








  Date Time  Temp Pulse Resp B/P (MAP) Pulse Ox O2 Delivery O2 Flow Rate FiO2


 


11/4/20 04:00 98.1 61 15 109/70 (83) 99   


 


11/4/20 00:00 97.9 66 16 111/56 (74) 98   


 


11/3/20 21:00      Room Air  


 


11/3/20 20:00 97.8 57 16 107/59 (75) 98   


 


11/3/20 15:38      Room Air  


 


11/3/20 15:10 98.4 57 18 107/64 (78) 98   


 


11/3/20 15:08 97.2 60 14 122/81 99 Room Air  


 


11/3/20 14:17 97.2       


 


11/3/20 13:05 97.2       


 


11/3/20 12:56  60 14 122/81 99 Room Air  


 


11/3/20 11:40 97.2 87 18 113/69 99 Room Air  


 


11/3/20 11:40  58 18   Room Air  


 


11/3/20 11:21 97.2 58 18 113/69 (84) 99 Room Air  

















Intake and Output  


 


 11/3/20 11/4/20





 19:00 07:00


 


Intake Total 195 ml 480 ml


 


Output Total  750 ml


 


Balance 195 ml -270 ml


 


  


 


Intake Oral 120 ml 480 ml


 


IV Total 75 ml 


 


Output Stool Total  750 ml


 


# Voids 2 3


 


# Bowel Movements  1








Laboratory Tests


11/3/20 11:52: 


White Blood Count 10.7, Red Blood Count 4.67, Hemoglobin 10.3L, Hematocrit 36.9L

, Mean Corpuscular Volume 79L, Mean Corpuscular Hemoglobin 22.1L, Mean 

Corpuscular Hemoglobin Concent 28.0L, Red Cell Distribution Width 17.4H, 

Platelet Count 60#L, Mean Platelet Volume 15.6H, Neutrophils (%) (Auto) , 

Lymphocytes (%) (Auto) , Monocytes (%) (Auto) , Eosinophils (%) (Auto) , 

Basophils (%) (Auto) , Differential Total Cells Counted 100, Neutrophils % 

(Manual) 69, Lymphocytes % (Manual) 25, Monocytes % (Manual) 6, Eosinophils % 

(Manual) 0, Basophils % (Manual) 0, Band Neutrophils 0, Platelet Estimate 

Adequate, Platelet Morphology See comment, Clumped Platelets 3+, Hypochromasia 

2+, Microcytosis 2+, Urine Color Pale yellow, Urine Appearance Slightly cloudy, 

Urine pH 6, Urine Specific Gravity 1.015, Urine Protein Negative, Urine Glucose 

(UA) Negative, Urine Ketones 4+H, Urine Blood Negative, Urine Nitrite Negative, 

Urine Bilirubin Negative, Urine Urobilinogen Normal, Urine Leukocyte Esterase 

Negative, Urine RBC 0, Urine WBC 0, Urine Squamous Epithelial Cells Few, Urine 

Bacteria ModerateH, Sodium Level 139, Potassium Level 3.0L, Chloride Level 101, 

Carbon Dioxide Level 22, Anion Gap 16H, Blood Urea Nitrogen 5L, Creatinine 0.9, 

Estimat Glomerular Filtration Rate > 60, Glucose Level 83, Calcium Level 8.9, 

Total Bilirubin 0.6, Aspartate Amino Transf (AST/SGOT) 16, Alanine 

Aminotransferase (ALT/SGPT) 9L, Alkaline Phosphatase 60, Total Protein 8.5H, 

Albumin 4.2, Globulin 4.3, Albumin/Globulin Ratio 1.0, Lipase 226, Urine Opiates

Screen Negative, Urine Barbiturates Screen Negative, Phencyclidine (PCP) Screen 

Negative, Urine Amphetamines Screen Negative, Urine Benzodiazepines Screen 

Negative, Urine Cocaine Screen Negative, Urine Marijuana (THC) Screen PositiveH


11/3/20 14:27: 


White Blood Count 4.8#, Red Blood Count 4.01L, Hemoglobin 8.8L, Hematocrit 31.9L

, Mean Corpuscular Volume 80, Mean Corpuscular Hemoglobin 22.0L, Mean 

Corpuscular Hemoglobin Concent 27.6L, Red Cell Distribution Width 17.7H, 

Platelet Count 147#L, Mean Platelet Volume 11.8H, Neutrophils (%) (Auto) 68.3, 

Lymphocytes (%) (Auto) 13.9L, Monocytes (%) (Auto) 13.1H, Eosinophils (%) (Auto)

0.0, Basophils (%) (Auto) 4.7H


11/4/20 05:21: 


White Blood Count 10.2#, Red Blood Count 3.92L, Hemoglobin 8.7L, Hematocrit 

30.8L, Mean Corpuscular Volume 79L, Mean Corpuscular Hemoglobin 22.2L, Mean 

Corpuscular Hemoglobin Concent 28.2L, Red Cell Distribution Width 17.1H, 

Platelet Count 23#L, Mean Platelet Volume 12.0H, Neutrophils (%) (Auto) , 

Lymphocytes (%) (Auto) , Monocytes (%) (Auto) , Eosinophils (%) (Auto) , 

Basophils (%) (Auto) , Differential Total Cells Counted 100, Neutrophils % (M

anual) 45, Lymphocytes % (Manual) 40, Monocytes % (Manual) 13H, Eosinophils % 

(Manual) 0, Basophils % (Manual) 2, Band Neutrophils 0, Platelet Estimate 

DecreasedL, Platelet Morphology Normal, Hypochromasia 2+, Microcytosis 1+, 

Sodium Level 140, Potassium Level 3.0L, Chloride Level 106, Carbon Dioxide Level

27, Anion Gap 7, Blood Urea Nitrogen 2L, Creatinine 0.8, Estimat Glomerular 

Filtration Rate > 60, Glucose Level 96, Calcium Level 8.0L, Total Bilirubin 0.3,

Aspartate Amino Transf (AST/SGOT) 15, Alanine Aminotransferase (ALT/SGPT) 7L, 

Alkaline Phosphatase 46, Total Protein 6.5, Albumin 3.1L, Globulin 3.4, 

Albumin/Globulin Ratio 0.9L, Anisocytosis 1+, Ovalocytes Occasional, Iron Level 

9L, Total Iron Binding Capacity 372, Percent Iron Saturation 2L, Unsaturated 

Iron Binding 363H, Ferritin 7L, Lactate Dehydrogenase 189, Amylase Level 46, 

Vitamin B12 Level 255, Folate 13.2, HIV (1&2) Antibody Rapid Negative


11/4/20 06:46: Stool Occult Blood [Pending]


11/4/20 08:25: 


White Blood Count 5.4, Red Blood Count 4.11L, Hemoglobin 9.2L, Hematocrit 32.2L,

 Mean Corpuscular Volume 78L, Mean Corpuscular Hemoglobin 22.3L, Mean 

Corpuscular Hemoglobin Concent 28.5L, Red Cell Distribution Width 17.0H, 

Platelet Count 143#L, Mean Platelet Volume 12.0H, Neutrophils (%) (Auto) 47.1, 

Lymphocytes (%) (Auto) 37.6, Monocytes (%) (Auto) 13.2H, Eosinophils (%) (Auto) 

0.6, Basophils (%) (Auto) 1.6, Hepatitis A IgM Antibody [Pending], Hepatitis B 

Surface Antigen [Pending], Hepatitis B Core IgM Antibody [Pending], Hepatitis C 

Antibody [Pending]


Height (Feet):  5


Height (Inches):  7.00


Weight (Pounds):  174


General Appearance:  lethargic


EENT:  normal ENT inspection


Neck:  normal alignment


Cardiovascular:  normal peripheral pulses, normal rate, regular rhythm


Respiratory/Chest:  chest wall non-tender, lungs clear, normal breath sounds


Abdomen:  normal bowel sounds, non tender, soft


Extremities:  normal inspection


Edema:  no edema noted Arm (L), no edema noted Arm (R), no edema noted Leg (L), 

no edema noted Leg (R), no edema noted Pedal (L), no edema noted Pedal (R), no 

edema noted Generalized


Neurologic:  motor weakness


Skin:  normal pigmentation, warm/dry





Assessment/Plan


Problem List:  


(1) Hypokalemia


ICD Codes:  E87.6 - Hypokalemia


SNOMED:  85674344


(2) Anemia


ICD Codes:  D64.9 - Anemia, unspecified


SNOMED:  425190601


(3) Marijuana abuse


ICD Codes:  F12.10 - Cannabis abuse, uncomplicated


SNOMED:  58962101


(4) Vomiting


ICD Codes:  R11.10 - Vomiting, unspecified


SNOMED:  149870048


(5) Cannabinoid hyperemesis syndrome


ICD Codes:  R11.2 - Nausea with vomiting, unspecified; F12.90 - Cannabis use, 

unspecified, uncomplicated


SNOMED:  256605732, 816390461


(6) Thrombocytopenia


ICD Codes:  D69.6 - Thrombocytopenia, unspecified


SNOMED:  757407515


(7) Abdominal pain


ICD Codes:  R10.9 - Unspecified abdominal pain


SNOMED:  07223114


Status:  unchanged


Assessment/Plan:


detox pain control gi heme f/u cbc bmp Santana Guerrero DO         Nov 4, 2020 09:27

## 2020-11-04 NOTE — NUR
NURSE NOTES:

Dr Fontenot was here earlier and new orders were taken. 

Patient had large amount of liquid stool (patient taking Lactulose), stool sample sent to 
lab.

## 2020-11-04 NOTE — NUR
NURSE NOTES:

After new pain medication, pain is relieved. No nausea noted. Given Pepcid 20mg 1t po. Call 
light within reach. Will continue to monitor. 

-------------------------------------------------------------------------------

Addendum: 11/04/20 at 2232 by Beth Zarco RN

-------------------------------------------------------------------------------

Given instruction to prevent from hitting herself d/t low PLT. No skin discoloration noted.

## 2020-11-04 NOTE — NUR
NURSE NOTES:

Receive new order from Dr. Barnett for pain and from Dr. Fontenot for anti-acid medication. 
Order noted and carried out. Will continue to monitor.

## 2020-11-05 VITALS — DIASTOLIC BLOOD PRESSURE: 75 MMHG | SYSTOLIC BLOOD PRESSURE: 113 MMHG

## 2020-11-05 VITALS — DIASTOLIC BLOOD PRESSURE: 64 MMHG | SYSTOLIC BLOOD PRESSURE: 109 MMHG

## 2020-11-05 VITALS — SYSTOLIC BLOOD PRESSURE: 112 MMHG | DIASTOLIC BLOOD PRESSURE: 65 MMHG

## 2020-11-05 VITALS — SYSTOLIC BLOOD PRESSURE: 103 MMHG | DIASTOLIC BLOOD PRESSURE: 63 MMHG

## 2020-11-05 VITALS — DIASTOLIC BLOOD PRESSURE: 66 MMHG | SYSTOLIC BLOOD PRESSURE: 111 MMHG

## 2020-11-05 VITALS — SYSTOLIC BLOOD PRESSURE: 113 MMHG | DIASTOLIC BLOOD PRESSURE: 75 MMHG

## 2020-11-05 LAB
ADD MANUAL DIFF: YES
ALBUMIN SERPL-MCNC: 2.9 G/DL (ref 3.4–5)
ALBUMIN/GLOB SERPL: 1 {RATIO} (ref 1–2.7)
ALP SERPL-CCNC: 45 U/L (ref 46–116)
ALT SERPL-CCNC: 7 U/L (ref 12–78)
ANION GAP SERPL CALC-SCNC: 7 MMOL/L (ref 5–15)
AST SERPL-CCNC: 16 U/L (ref 15–37)
BILIRUB SERPL-MCNC: 0.3 MG/DL (ref 0.2–1)
BUN SERPL-MCNC: 1 MG/DL (ref 7–18)
CALCIUM SERPL-MCNC: 8.2 MG/DL (ref 8.5–10.1)
CHLORIDE SERPL-SCNC: 106 MMOL/L (ref 98–107)
CO2 SERPL-SCNC: 28 MMOL/L (ref 21–32)
CREAT SERPL-MCNC: 0.7 MG/DL (ref 0.55–1.3)
ERYTHROCYTE [DISTWIDTH] IN BLOOD BY AUTOMATED COUNT: 16.7 % (ref 11.6–14.8)
GLOBULIN SER-MCNC: 2.9 G/DL
HCT VFR BLD CALC: 29.6 % (ref 37–47)
HGB BLD-MCNC: 8.5 G/DL (ref 12–16)
INR PPP: 1.2 (ref 0.9–1.1)
MCV RBC AUTO: 76 FL (ref 80–99)
PLATELET # BLD: 292 K/UL (ref 150–450)
POTASSIUM SERPL-SCNC: 3.3 MMOL/L (ref 3.5–5.1)
RBC # BLD AUTO: 3.89 M/UL (ref 4.2–5.4)
SODIUM SERPL-SCNC: 141 MMOL/L (ref 136–145)
WBC # BLD AUTO: 5.3 K/UL (ref 4.8–10.8)

## 2020-11-05 RX ADMIN — SODIUM CHLORIDE SCH MLS/HR: 0.9 INJECTION INTRAVENOUS at 20:26

## 2020-11-05 RX ADMIN — DEXTROSE, SODIUM CHLORIDE, AND POTASSIUM CHLORIDE SCH MLS/HR: 5; .45; .075 INJECTION INTRAVENOUS at 08:48

## 2020-11-05 NOTE — NUR
HAND-OFF: 

Report given to DEDRA Crespo. Pt will discharge after lab is done.

-------------------------------------------------------------------------------

Addendum: 11/05/20 at 0734 by Beth Zarco RN

-------------------------------------------------------------------------------

INCORRECT CHARTING

## 2020-11-05 NOTE — NUR
CASE MANAGEMENT:REVIEW



SI;CANNABINOID HYPEREMESIS SYNDROME

THROMBOCYTOPENIA. ANEMIA. HYPOKALEMIA.

98.2   52   18   120/78   99% ON RA

RBC 3.89   H/H 8.5/29.6   K+ 3.3   ALB 2.9



IS;K-DUR PO ONCE

PEPCID PO 

IRON SUCROSE IV

IVF D5W @ 75 ML/HR



****MED SURG STATUS****

DCP;FROM HOME



PLAN;

PER HEME ~ RULE OUT DIC

## 2020-11-05 NOTE — NUR
NURSE HAND-OFF: 



Important Events on Shift: advance diet, tolerated well

Patient Status: stable

Diet: regular 



Pending Orders: n/a

Pending Results/Labs:n/a

Pending MD notification:n/a



Latest Vital Signs: Temperature 98.2 , Pulse 60 , B/P 113 /75 , Respiratory Rate 18 , O2 SAT 
100 , Room Air, O2 Flow Rate .  

Vital Sign Comment: n/a



Latest Leigh Fall Score: 35  

Fall Risk: Medium Risk 

Safety Measures: Call light Within Reach, Side Rails x2, Bed position Low and Locked.

Fall Precautions: 

Yellow Socks

Door Sign

Patient Fall Education



Report given to Franco COLMENARES.

## 2020-11-05 NOTE — GENERAL PROGRESS NOTE
Subjective


ROS Limited/Unobtainable:  No


Allergies:  


Coded Allergies:  


     PENICILLINS (Unverified  Allergy, Mild, 11/4/20)


Uncoded Allergies:  


     PENICILLIN (Allergy, Mild, 11/1/20)





Objective





Last 24 Hour Vital Signs








  Date Time  Temp Pulse Resp B/P (MAP) Pulse Ox O2 Delivery O2 Flow Rate FiO2


 


11/5/20 09:00      Room Air  


 


11/5/20 08:00 98.0 62 18 112/65 (81) 99   


 


11/5/20 04:00 98.1 52 18 103/63 (76) 99   


 


11/5/20 00:00 97.7 59 18 109/64 (79) 99   


 


11/4/20 21:00      Room Air  


 


11/4/20 20:00 98.2 61 15 120/78 (92) 100   


 


11/4/20 16:00 98.6 56 15 111/69 (83) 99   


 


11/4/20 12:00 97.2 62 15 104/67 (79) 99   

















Intake and Output  


 


 11/4/20 11/5/20





 19:00 07:00


 


Intake Total 1125 ml 800 ml


 


Output Total  100 ml


 


Balance 1125 ml 700 ml


 


  


 


Intake Oral 300 ml 50 ml


 


IV Total 825 ml 750 ml


 


Emesis  100 ml


 


# Voids 4 2


 


# Bowel Movements 3 








Laboratory Tests


11/5/20 04:50: 


White Blood Count 5.3, Red Blood Count 3.89L, Hemoglobin 8.5L, Hematocrit 29.6L,

Mean Corpuscular Volume 76L, Mean Corpuscular Hemoglobin 21.9L, Mean Corpuscular

Hemoglobin Concent 28.8L, Red Cell Distribution Width 16.7H, Platelet Count 

292#, Mean Platelet Volume 9.0, Neutrophils (%) (Auto) , Lymphocytes (%) (Auto) 

, Monocytes (%) (Auto) , Eosinophils (%) (Auto) , Basophils (%) (Auto) , 

Differential Total Cells Counted 100, Neutrophils % (Manual) 55, Lymphocytes % 

(Manual) 27, Monocytes % (Manual) 17H, Eosinophils % (Manual) 0, Basophils % 

(Manual) 0, Band Neutrophils 1, Platelet Estimate Adequate, Platelet Morphology 

, Clumped Platelets 3+, Polychromasia 1+, Hypochromasia 1+, Anisocytosis 1+, 

Microcytosis 1+, Prothrombin Time 13.0H, Prothromb Time International Ratio 1.2H

, Sodium Level 141, Potassium Level 3.3L, Chloride Level 106, Carbon Dioxide 

Level 28, Anion Gap 7, Blood Urea Nitrogen 1L, Creatinine 0.7, Estimat 

Glomerular Filtration Rate > 60, Glucose Level 94, Calcium Level 8.2L, Total 

Bilirubin 0.3, Aspartate Amino Transf (AST/SGOT) 16, Alanine Aminotransferase 

(ALT/SGPT) 7L, Alkaline Phosphatase 45L, Total Protein 5.8L, Albumin 2.9L, 

Globulin 2.9, Albumin/Globulin Ratio 1.0


Height (Feet):  5


Height (Inches):  7.00


Weight (Pounds):  174


General Appearance:  alert


EENT:  normal ENT inspection


Neck:  supple


Cardiovascular:  normal peripheral pulses


Respiratory/Chest:  decreased breath sounds


Abdomen:  normal bowel sounds, non tender, soft


Extremities:  non-tender





Assessment/Plan


Problem List:  


(1) Cannabinoid hyperemesis syndrome


ICD Codes:  R11.2 - Nausea with vomiting, unspecified; F12.90 - Cannabis use, 

unspecified, uncomplicated


SNOMED:  953785694, 928176497


(2) Vomiting


ICD Codes:  R11.10 - Vomiting, unspecified


SNOMED:  858449857


(3) Abdominal pain


ICD Codes:  R10.9 - Unspecified abdominal pain


SNOMED:  63207044


(4) Thrombocytopenia


ICD Codes:  D69.6 - Thrombocytopenia, unspecified


SNOMED:  525842290


(5) Anemia


ICD Codes:  D64.9 - Anemia, unspecified


SNOMED:  193891563


(6) Hypokalemia


ICD Codes:  E87.6 - Hypokalemia


SNOMED:  93055731


Status:  unchanged


Assessment/Plan:


IVF>>> will dc


pain control


control nausea


anemia work up>>> irob def>>> add venofer>>> fu stool ob>>>neg


replace K


repeat labs


advance diet to reg


avoid THC











Lewis Barnett MD              Nov 5, 2020 09:59

## 2020-11-05 NOTE — NUR
NURSE HAND-OFF: 



Important Events on Shift: epigastric pain, N/V-Given Dilaudid 1mg IVS x1 and Pepcin 20mg 1 
t po

                                     --->decreased pain, no N/V over night

Patient Status: stable

Diet: full liquid diet



Pending Orders: [-]

Pending Results/Labs:CBC, CMP, PT

Pending MD notification:[-]



Latest Vital Signs: Temperature 98.1 , Pulse 52 , B/P 103 /63 , Respiratory Rate 18 , O2 SAT 
99 , Room Air, O2 Flow Rate .  

Vital Sign Comment: []



Latest Leigh Fall Score: 35  

Fall Risk: Medium Risk 

Safety Measures: Call light Within Reach, Bed Alarm Zone 1, Side Rails Side Rails x2, Bed 
position Low and Locked.

Fall Precautions: 

Yellow Socks

Door Sign

Patient Fall Education

## 2020-11-05 NOTE — GENERAL PROGRESS NOTE
Subjective


Constitutional:  Reports: weakness


Allergies:  


Coded Allergies:  


     PENICILLINS (Unverified  Allergy, Mild, 11/4/20)


Uncoded Allergies:  


     PENICILLIN (Allergy, Mild, 11/1/20)


All Systems:  reviewed and negative except above


Subjective


calm in bed





Objective





Last 24 Hour Vital Signs








  Date Time  Temp Pulse Resp B/P (MAP) Pulse Ox O2 Delivery O2 Flow Rate FiO2


 


11/5/20 12:00 98.3 60 18 113/75 (88) 99   


 


11/5/20 09:00      Room Air  


 


11/5/20 08:00 98.0 62 18 112/65 (81) 99   


 


11/5/20 04:00 98.1 52 18 103/63 (76) 99   


 


11/5/20 00:00 97.7 59 18 109/64 (79) 99   


 


11/4/20 21:00      Room Air  


 


11/4/20 20:00 98.2 61 15 120/78 (92) 100   


 


11/4/20 16:00 98.6 56 15 111/69 (83) 99   

















Intake and Output  


 


 11/4/20 11/5/20





 19:00 07:00


 


Intake Total 1125 ml 800 ml


 


Output Total  100 ml


 


Balance 1125 ml 700 ml


 


  


 


Intake Oral 300 ml 50 ml


 


IV Total 825 ml 750 ml


 


Emesis  100 ml


 


# Voids 4 2


 


# Bowel Movements 3 








Laboratory Tests


11/5/20 04:50: 


White Blood Count 5.3, Red Blood Count 3.89L, Hemoglobin 8.5L, Hematocrit 29.6L,

Mean Corpuscular Volume 76L, Mean Corpuscular Hemoglobin 21.9L, Mean Corpuscular

Hemoglobin Concent 28.8L, Red Cell Distribution Width 16.7H, Platelet Count 

292#, Mean Platelet Volume 9.0, Neutrophils (%) (Auto) , Lymphocytes (%) (Auto) 

, Monocytes (%) (Auto) , Eosinophils (%) (Auto) , Basophils (%) (Auto) , 

Differential Total Cells Counted 100, Neutrophils % (Manual) 55, Lymphocytes % 

(Manual) 27, Monocytes % (Manual) 17H, Eosinophils % (Manual) 0, Basophils % 

(Manual) 0, Band Neutrophils 1, Platelet Estimate Adequate, Platelet Morphology 

, Clumped Platelets 3+, Polychromasia 1+, Hypochromasia 1+, Anisocytosis 1+, 

Microcytosis 1+, Prothrombin Time 13.0H, Prothromb Time International Ratio 1.2H

, Sodium Level 141, Potassium Level 3.3L, Chloride Level 106, Carbon Dioxide 

Level 28, Anion Gap 7, Blood Urea Nitrogen 1L, Creatinine 0.7, Estimat 

Glomerular Filtration Rate > 60, Glucose Level 94, Calcium Level 8.2L, Total 

Bilirubin 0.3, Aspartate Amino Transf (AST/SGOT) 16, Alanine Aminotransferase 

(ALT/SGPT) 7L, Alkaline Phosphatase 45L, Total Protein 5.8L, Albumin 2.9L, 

Globulin 2.9, Albumin/Globulin Ratio 1.0


Height (Feet):  5


Height (Inches):  7.00


Weight (Pounds):  174


General Appearance:  alert


EENT:  normal ENT inspection


Neck:  normal alignment


Cardiovascular:  normal peripheral pulses, normal rate, regular rhythm


Respiratory/Chest:  chest wall non-tender, lungs clear, normal breath sounds


Abdomen:  normal bowel sounds, non tender, soft


Extremities:  normal inspection


Edema:  no edema noted Arm (L), no edema noted Arm (R), no edema noted Leg (L), 

no edema noted Leg (R), no edema noted Pedal (L), no edema noted Pedal (R), no 

edema noted Generalized


Neurologic:  responsive, motor weakness


Skin:  normal pigmentation, warm/dry





Assessment/Plan


Problem List:  


(1) Hypokalemia


ICD Codes:  E87.6 - Hypokalemia


SNOMED:  76128847


(2) Anemia


ICD Codes:  D64.9 - Anemia, unspecified


SNOMED:  270956441


(3) Marijuana abuse


ICD Codes:  F12.10 - Cannabis abuse, uncomplicated


SNOMED:  28216643


(4) Vomiting


ICD Codes:  R11.10 - Vomiting, unspecified


SNOMED:  988811724


(5) Cannabinoid hyperemesis syndrome


ICD Codes:  R11.2 - Nausea with vomiting, unspecified; F12.90 - Cannabis use, 

unspecified, uncomplicated


SNOMED:  802146226, 131922629


(6) Thrombocytopenia


ICD Codes:  D69.6 - Thrombocytopenia, unspecified


SNOMED:  321808648


(7) Abdominal pain


ICD Codes:  R10.9 - Unspecified abdominal pain


SNOMED:  55434507


Status:  stable, progressing


Assessment/Plan:


detox pain control gi heme f/u cbc bmp Santana Guerrero DO         Nov 5, 2020 14:04

## 2020-11-05 NOTE — NUR
NURSE NOTES:

Patient is in bed awake and able to verbalize needs. Stable. Denies pain or SOB. Patient 
instructed to use call light for assistance, verbalized understanding. Plan of care 
discussed with patient. All safety measures provided. Patient is in bed in locked and lowest 
position with call light within reach. All needs met at this time. WIll continue to monitor.

## 2020-11-05 NOTE — HEMATOLOGY/ONC PROGRESS NOTE
Assessment/Plan


Assessment/Plan





Assessment and Resc


# Thrombocytopenia acute onset, this is first time she has been admitted, no 

thrombocytopenia history in the past, no recent ingestion, no recent meds, no 

spont abortions


--> hep and hiv ordered


--> us abd ordered


--> if above neg and repeat low plts start steriods


--> r/o DIC as well


--> meds reviewed


--> 180-->60-->23-->143


# Anemia r/o iron deficiency


--> anemia panel ordered--> cw aid


--> transf prn


-> per gi eval


--> IV IRON x 5 days started


# Hypokalemia


--> replete with k


# Abd pain


-> Zofran and Pepcid


--> do no smoke thc


# Dvt ppx scds





Appreciate consultation and yola COLMENARES





Subjective


HEENT:  Denies: no symptoms, eye pain, blurred vision, tearing, double vision, 

ear pain, ear discharge, nose pain, nose congestion, throat pain, throat swelli

ng, mouth pain, mouth swelling, other


Cardiovascular:  Denies: no symptoms, chest pain, edema, irregular heart rate, 

lightheadedness, palpitations, syncope, other


Respiratory:  Denies: no symptoms, cough, shortness of breath, SOB with 

excertion, SOB at rest, sputum, wheezing, other


Gastrointestinal/Abdominal:  Denies: no symptoms, abdomen distended, abdominal 

pain, black stools, tarry stools, blood in stool, constipated, diarrhea, 

difficulty swallowing, nausea, poor appetite, poor fluid intake, rectal 

bleeding, vomiting, other


Genitourinary:  Denies: no symptoms, burning, discharge, frequency, flank pain, 

hematuria, incontinence, pain, urgency, other


Neurologic/Psychiatric:  Denies: no symptoms, anxiety, depressed, emotional 

problems, headache, numbness, paresthesia, pre-existing deficit, seizure, 

tingling, tremors, weakness, other


Endocrine:  Denies: no symptoms, excessive sweating, flushing, intolerance to 

cold, intolerance to heat, increased hunger, increased thirst, increased urine, 

unexplained weight gain, unexplained weight loss, other


Allergies:  


Coded Allergies:  


     PENICILLINS (Unverified  Allergy, Mild, 11/4/20)


Uncoded Allergies:  


     PENICILLIN (Allergy, Mild, 11/1/20)


Subjective


11/5 no pain, no bleeding, abd feels better, on iv iron at bedside completed





Objective


Objective





Current Medications








 Medications


  (Trade)  Dose


 Ordered  Sig/Shwetha


 Route


 PRN Reason  Start Time


 Stop Time Status Last Admin


Dose Admin


 


 Dextrose/


 Electrolytes  1,000 ml @ 


 75 mls/hr  [id number]


 IV


   11/3/20 17:00


 12/3/20 16:59  11/4/20 18:38





 


 Famotidine


  (Pepcid)  20 mg  DAILY


 ORAL


   11/5/20 09:00


 2/3/21 08:59   





 


 Hydromorphone HCl


  (Dilaudid)  1 mg  Q4H  PRN


 IVP


 Severe Pain (Pain Scale 7-10)  11/4/20 20:15


 11/11/20 20:14  11/4/20 20:22





 


 Iohexol


  (OMNIPAQUE-300


 100ml)  100 ml  NOW  PRN


 INJ


 Radiology Procedure  11/3/20 13:15


 11/5/20 13:14   





 


 Iron Sucrose 100


 mg/Sodium Chloride  60 ml @ 


 240 mls/hr  BEDTIME


 IVPB


   11/4/20 21:00


 11/8/20 21:14  11/4/20 20:54





 


 Ondansetron HCl


  (Zofran)  4 mg  Q4H  PRN


 IVP


 Nausea & Vomiting  11/3/20 16:30


 12/3/20 16:29  11/4/20 18:35














Last 24 Hour Vital Signs








  Date Time  Temp Pulse Resp B/P (MAP) Pulse Ox O2 Delivery O2 Flow Rate FiO2


 


11/5/20 04:00 98.1 52 18 103/63 (76) 99   


 


11/5/20 00:00 97.7 59 18 109/64 (79) 99   


 


11/4/20 21:00      Room Air  


 


11/4/20 20:00 98.2 61 15 120/78 (92) 100   


 


11/4/20 16:00 98.6 56 15 111/69 (83) 99   


 


11/4/20 12:00 97.2 62 15 104/67 (79) 99   


 


11/4/20 09:00      Room Air  


 


11/4/20 08:00 97.9 58 15 97/65 (76) 98   


 


11/4/20 04:00 98.1 61 15 109/70 (83) 99   


 


11/4/20 00:00 97.9 66 16 111/56 (74) 98   


 


11/3/20 21:00      Room Air  


 


11/3/20 20:00 97.8 57 16 107/59 (75) 98   


 


11/3/20 15:38      Room Air  


 


11/3/20 15:10 98.4 57 18 107/64 (78) 98   


 


11/3/20 15:08 97.2 60 14 122/81 99 Room Air  


 


11/3/20 14:17 97.2       


 


11/3/20 13:05 97.2       


 


11/3/20 12:56  60 14 122/81 99 Room Air  


 


11/3/20 11:40 97.2 87 18 113/69 99 Room Air  


 


11/3/20 11:40  58 18   Room Air  


 


11/3/20 11:21 97.2 58 18 113/69 (84) 99 Room Air  

















Intake and Output  


 


 11/4/20 11/5/20





 19:00 07:00


 


Intake Total 1125 ml 800 ml


 


Output Total  100 ml


 


Balance 1125 ml 700 ml


 


  


 


Intake Oral 300 ml 50 ml


 


IV Total 825 ml 750 ml


 


Emesis  100 ml


 


# Voids 4 2


 


# Bowel Movements 3 











Labs








Test


 11/3/20


11:52 11/3/20


14:27 11/4/20


05:21 11/4/20


06:46


 


White Blood Count


 10.7 K/UL


(4.8-10.8) 4.8 K/UL


(4.8-10.8) 10.2 K/UL


(4.8-10.8) 





 


Red Blood Count


 4.67 M/UL


(4.20-5.40) 4.01 M/UL


(4.20-5.40) 3.92 M/UL


(4.20-5.40) 





 


Hemoglobin


 10.3 G/DL


(12.0-16.0) 8.8 G/DL


(12.0-16.0) 8.7 G/DL


(12.0-16.0) 





 


Hematocrit


 36.9 %


(37.0-47.0) 31.9 %


(37.0-47.0) 30.8 %


(37.0-47.0) 





 


Mean Corpuscular Volume 79 FL (80-99)  80 FL (80-99)  79 FL (80-99)  


 


Mean Corpuscular Hemoglobin


 22.1 PG


(27.0-31.0) 22.0 PG


(27.0-31.0) 22.2 PG


(27.0-31.0) 





 


Mean Corpuscular Hemoglobin


Concent 28.0 G/DL


(32.0-36.0) 27.6 G/DL


(32.0-36.0) 28.2 G/DL


(32.0-36.0) 





 


Red Cell Distribution Width


 17.4 %


(11.6-14.8) 17.7 %


(11.6-14.8) 17.1 %


(11.6-14.8) 





 


Platelet Count


 60 K/UL


(150-450) 147 K/UL


(150-450) 23 K/UL


(150-450) 





 


Mean Platelet Volume


 15.6 FL


(6.5-10.1) 11.8 FL


(6.5-10.1) 12.0 FL


(6.5-10.1) 





 


Neutrophils (%) (Auto)


  % (45.0-75.0) 


 68.3 %


(45.0-75.0)  % (45.0-75.0) 


 





 


Lymphocytes (%) (Auto)


  % (20.0-45.0) 


 13.9 %


(20.0-45.0)  % (20.0-45.0) 


 





 


Monocytes (%) (Auto)


  % (1.0-10.0) 


 13.1 %


(1.0-10.0)  % (1.0-10.0) 


 





 


Eosinophils (%) (Auto)


  % (0.0-3.0) 


 0.0 %


(0.0-3.0)  % (0.0-3.0) 


 





 


Basophils (%) (Auto)


  % (0.0-2.0) 


 4.7 %


(0.0-2.0)  % (0.0-2.0) 


 





 


Differential Total Cells


Counted 100 


 


 100 


 





 


Neutrophils % (Manual) 69 % (45-75)   45 % (45-75)  


 


Lymphocytes % (Manual) 25 % (20-45)   40 % (20-45)  


 


Monocytes % (Manual) 6 % (1-10)   13 % (1-10)  


 


Eosinophils % (Manual) 0 % (0-3)   0 % (0-3)  


 


Basophils % (Manual) 0 % (0-2)   2 % (0-2)  


 


Band Neutrophils 0 % (0-8)   0 % (0-8)  


 


Platelet Estimate Adequate   Decreased  


 


Platelet Morphology See comment   Normal  


 


Clumped Platelets 3+    


 


Hypochromasia 2+   2+  


 


Microcytosis 2+   1+  


 


Urine Color Pale yellow    


 


Urine Appearance


 Slightly


cloudy 


 


 





 


Urine pH 6 (4.5-8.0)    


 


Urine Specific Gravity


 1.015


(1.005-1.035) 


 


 





 


Urine Protein


 Negative


(NEGATIVE) 


 


 





 


Urine Glucose (UA)


 Negative


(NEGATIVE) 


 


 





 


Urine Ketones 4+ (NEGATIVE)    


 


Urine Blood


 Negative


(NEGATIVE) 


 


 





 


Urine Nitrite


 Negative


(NEGATIVE) 


 


 





 


Urine Bilirubin


 Negative


(NEGATIVE) 


 


 





 


Urine Urobilinogen


 Normal MG/DL


(0.0-1.0) 


 


 





 


Urine Leukocyte Esterase


 Negative


(NEGATIVE) 


 


 





 


Urine RBC 0 /HPF (0 - 2)    


 


Urine WBC 0 /HPF (0 - 2)    


 


Urine Squamous Epithelial


Cells Few /LPF


(NONE/OCC) 


 


 





 


Urine Bacteria


 Moderate /HPF


(NONE) 


 


 





 


Sodium Level


 139 MMOL/L


(136-145) 


 140 MMOL/L


(136-145) 





 


Potassium Level


 3.0 MMOL/L


(3.5-5.1) 


 3.0 MMOL/L


(3.5-5.1) 





 


Chloride Level


 101 MMOL/L


() 


 106 MMOL/L


() 





 


Carbon Dioxide Level


 22 MMOL/L


(21-32) 


 27 MMOL/L


(21-32) 





 


Anion Gap


 16 mmol/L


(5-15) 


 7 mmol/L


(5-15) 





 


Blood Urea Nitrogen 5 mg/dL (7-18)   2 mg/dL (7-18)  


 


Creatinine


 0.9 MG/DL


(0.55-1.30) 


 0.8 MG/DL


(0.55-1.30) 





 


Estimat Glomerular Filtration


Rate > 60 mL/min


(>60) 


 > 60 mL/min


(>60) 





 


Glucose Level


 83 MG/DL


() 


 96 MG/DL


() 





 


Calcium Level


 8.9 MG/DL


(8.5-10.1) 


 8.0 MG/DL


(8.5-10.1) 





 


Total Bilirubin


 0.6 MG/DL


(0.2-1.0) 


 0.3 MG/DL


(0.2-1.0) 





 


Aspartate Amino Transf


(AST/SGOT) 16 U/L (15-37) 


 


 15 U/L (15-37) 


 





 


Alanine Aminotransferase


(ALT/SGPT) 9 U/L (12-78) 


 


 7 U/L (12-78) 


 





 


Alkaline Phosphatase


 60 U/L


() 


 46 U/L


() 





 


Total Protein


 8.5 G/DL


(6.4-8.2) 


 6.5 G/DL


(6.4-8.2) 





 


Albumin


 4.2 G/DL


(3.4-5.0) 


 3.1 G/DL


(3.4-5.0) 





 


Globulin 4.3 g/dL   3.4 g/dL  


 


Albumin/Globulin Ratio 1.0 (1.0-2.7)   0.9 (1.0-2.7)  


 


Lipase


 226 U/L


() 


 


 





 


Urine Opiates Screen


 Negative


(NEGATIVE) 


 


 





 


Urine Barbiturates Screen


 Negative


(NEGATIVE) 


 


 





 


Phencyclidine (PCP) Screen


 Negative


(NEGATIVE) 


 


 





 


Urine Amphetamines Screen


 Negative


(NEGATIVE) 


 


 





 


Urine Benzodiazepines Screen


 Negative


(NEGATIVE) 


 


 





 


Urine Cocaine Screen


 Negative


(NEGATIVE) 


 


 





 


Urine Marijuana (THC) Screen


 Positive


(NEGATIVE) 


 


 





 


Anisocytosis   1+  


 


Ovalocytes   Occasional  


 


Iron Level


 


 


 9 ug/dL


() 





 


Total Iron Binding Capacity


 


 


 372 ug/dL


(250-450) 





 


Percent Iron Saturation   2 % (15-50)  


 


Unsaturated Iron Binding


 


 


 363 ug/dL


(112-346) 





 


Ferritin


 


 


 7 NG/ML


(8-388) 





 


Lactate Dehydrogenase


 


 


 189 U/L


() 





 


Amylase Level


 


 


 46 U/L


() 





 


Vitamin B12 Level


 


 


 255 PG/ML


(193-986) 





 


Folate


 


 


 13.2 NG/ML


(8.6-58.9) 





 


HIV (1&2) Antibody Rapid


 


 


 Negative


(NEGATIVE) 





 


Stool Occult Blood


 


 


 


 Negative


(NEGATIVE)


 


Test


 11/4/20


08:25 


 


 





 


White Blood Count


 5.4 K/UL


(4.8-10.8) 


 


 





 


Red Blood Count


 4.11 M/UL


(4.20-5.40) 


 


 





 


Hemoglobin


 9.2 G/DL


(12.0-16.0) 


 


 





 


Hematocrit


 32.2 %


(37.0-47.0) 


 


 





 


Mean Corpuscular Volume 78 FL (80-99)    


 


Mean Corpuscular Hemoglobin


 22.3 PG


(27.0-31.0) 


 


 





 


Mean Corpuscular Hemoglobin


Concent 28.5 G/DL


(32.0-36.0) 


 


 





 


Red Cell Distribution Width


 17.0 %


(11.6-14.8) 


 


 





 


Platelet Count


 143 K/UL


(150-450) 


 


 





 


Mean Platelet Volume


 12.0 FL


(6.5-10.1) 


 


 





 


Neutrophils (%) (Auto)


 47.1 %


(45.0-75.0) 


 


 





 


Lymphocytes (%) (Auto)


 37.6 %


(20.0-45.0) 


 


 





 


Monocytes (%) (Auto)


 13.2 %


(1.0-10.0) 


 


 





 


Eosinophils (%) (Auto)


 0.6 %


(0.0-3.0) 


 


 





 


Basophils (%) (Auto)


 1.6 %


(0.0-2.0) 


 


 





 


Hepatitis A IgM Antibody


 Negative


(Negative) 


 


 





 


Hepatitis B Surface Antigen


 Negative


(Negative) 


 


 





 


Hepatitis B Core IgM Antibody


 Negative


(Negative) 


 


 





 


Hepatitis C Antibody


 <0.1 s/co


ratio 


 


 











Height (Feet):  5


Height (Inches):  7.00


Weight (Pounds):  174


Objective


GENERAL: Awake alert


HEENT: Extraocular muscles are intact. 


RESP: Normal respiratory effort. Symmetric rise. No stridor. Ctab


CARDIAC: Bradycardic and regular rhytm. No_significant pedal edema.


Negative Langford sign.  Negative Rovsing's.  


ABDOMEN: Soft. Nondistended.  


MSK:  Normal muscle tone, without rigidity. 


SKIN: Warm and dry.  No visible cyanosis


NEUROLOGIC: Alert, oriented x3. 


Psych: Normal mood and affect











Master Fontenot MD           Nov 5, 2020 06:35

## 2020-11-05 NOTE — NUR
NURSE NOTES:

Received report from DEDRA Doe and rounds made. Received pt laying in bed AOX4, denies any 
pain, no nausea or vomiting. IV L FA patent and intact. NAD noted. Bed in lowest position 
and locked. Call light within reach. Side rails up x 2. Will continue to monitor.

## 2020-11-06 VITALS — SYSTOLIC BLOOD PRESSURE: 110 MMHG | DIASTOLIC BLOOD PRESSURE: 61 MMHG

## 2020-11-06 VITALS — SYSTOLIC BLOOD PRESSURE: 113 MMHG | DIASTOLIC BLOOD PRESSURE: 69 MMHG

## 2020-11-06 LAB
ADD MANUAL DIFF: NO
ALBUMIN SERPL-MCNC: 3.2 G/DL (ref 3.4–5)
ALBUMIN/GLOB SERPL: 1.1 {RATIO} (ref 1–2.7)
ALP SERPL-CCNC: 53 U/L (ref 46–116)
ALT SERPL-CCNC: < 6 U/L (ref 12–78)
AST SERPL-CCNC: 12 U/L (ref 15–37)
BASOPHILS NFR BLD AUTO: 1.4 % (ref 0–2)
BILIRUB SERPL-MCNC: 0.3 MG/DL (ref 0.2–1)
BUN SERPL-MCNC: 2 MG/DL (ref 7–18)
CALCIUM SERPL-MCNC: 8.5 MG/DL (ref 8.5–10.1)
CHLORIDE SERPL-SCNC: 106 MMOL/L (ref 98–107)
CO2 SERPL-SCNC: 29 MMOL/L (ref 21–32)
CREAT SERPL-MCNC: 0.8 MG/DL (ref 0.55–1.3)
EOSINOPHIL NFR BLD AUTO: 0.5 % (ref 0–3)
ERYTHROCYTE [DISTWIDTH] IN BLOOD BY AUTOMATED COUNT: 17 % (ref 11.6–14.8)
GLOBULIN SER-MCNC: 3 G/DL
HCT VFR BLD CALC: 33.2 % (ref 37–47)
HGB BLD-MCNC: 9.4 G/DL (ref 12–16)
INR PPP: 1.2 (ref 0.9–1.1)
LYMPHOCYTES NFR BLD AUTO: 42.9 % (ref 20–45)
MCV RBC AUTO: 79 FL (ref 80–99)
MONOCYTES NFR BLD AUTO: 10.7 % (ref 1–10)
NEUTROPHILS NFR BLD AUTO: 44.5 % (ref 45–75)
PLATELET # BLD: 120 K/UL (ref 150–450)
POTASSIUM SERPL-SCNC: 3.7 MMOL/L (ref 3.5–5.1)
RBC # BLD AUTO: 4.22 M/UL (ref 4.2–5.4)
SODIUM SERPL-SCNC: 140 MMOL/L (ref 136–145)
WBC # BLD AUTO: 6.1 K/UL (ref 4.8–10.8)

## 2020-11-06 NOTE — NUR
NURSE NOTES:

Discharge instruction was given by RN. Patient is aware. Patient is leaving herself with 
uber. IV is removed. Skin is intact. Patient received all belongings. Questions were 
answered.

## 2020-11-06 NOTE — NUR
NURSE NOTES:

Patient is in bed awake and able to verbalize needs. Stable. Denies pain or SOB. Plan of 
care discussed with patient. Patient instructed to use call light for assistance, verbalized 
understanding. All safety measures provided. All needs met at this time. Patient is in bed 
in locked and lowest position with call light within reach. Will continue to monitor.

## 2020-11-06 NOTE — GENERAL PROGRESS NOTE
Subjective


Allergies:  


Coded Allergies:  


     PENICILLINS (Unverified  Allergy, Mild, 11/4/20)


Uncoded Allergies:  


     PENICILLIN (Allergy, Mild, 11/1/20)


All Systems:  reviewed and negative except above


Subjective


calm in bed





Objective





Last 24 Hour Vital Signs








  Date Time  Temp Pulse Resp B/P (MAP) Pulse Ox O2 Delivery O2 Flow Rate FiO2


 


11/6/20 09:00      Room Air  


 


11/6/20 08:00 98.3 64 20 113/69 (84) 100   


 


11/6/20 04:00 98.4 71 18 110/61 (77) 97   


 


11/5/20 21:00      Room Air  


 


11/5/20 20:00 98.5 73 18 111/66 (81) 97   


 


11/5/20 16:00 98.2 60 18 113/75 (88) 100   


 


11/5/20 12:00 98.3 60 18 113/75 (88) 99   

















Intake and Output  


 


 11/5/20 11/6/20





 19:00 07:00


 


Intake Total  180 ml


 


Balance  180 ml


 


  


 


Intake Oral  120 ml


 


IV Total  60 ml


 


# Voids  2








Laboratory Tests


11/6/20 05:00: 


White Blood Count 6.1, Red Blood Count 4.22, Hemoglobin 9.4L, Hematocrit 33.2L, 

Mean Corpuscular Volume 79L, Mean Corpuscular Hemoglobin 22.2L, Mean Corpuscular

Hemoglobin Concent 28.2L, Red Cell Distribution Width 17.0H, Platelet Count 

120#L, Mean Platelet Volume 9.9, Neutrophils (%) (Auto) 44.5L, Lymphocytes (%) 

(Auto) 42.9, Monocytes (%) (Auto) 10.7H, Eosinophils (%) (Auto) 0.5, Basophils 

(%) (Auto) 1.4, Prothrombin Time 12.7H, Prothromb Time International Ratio 1.2H,

Sodium Level 140, Potassium Level 3.7, Chloride Level 106, Carbon Dioxide Level 

29, Blood Urea Nitrogen 2L, Creatinine 0.8, Estimat Glomerular Filtration Rate >

60, Glucose Level 81, Calcium Level 8.5, Total Bilirubin 0.3, Aspartate Amino 

Transf (AST/SGOT) 12L, Alanine Aminotransferase (ALT/SGPT) < 6L, Alkaline 

Phosphatase 53, Total Protein 6.2L, Albumin 3.2L, Globulin 3.0, Albumin/Globulin

Ratio 1.1


Height (Feet):  5


Height (Inches):  7.00


Weight (Pounds):  174


General Appearance:  lethargic


EENT:  normal ENT inspection


Neck:  normal alignment


Cardiovascular:  normal peripheral pulses, normal rate, regular rhythm


Respiratory/Chest:  chest wall non-tender, lungs clear, normal breath sounds


Abdomen:  normal bowel sounds, non tender, soft


Extremities:  normal inspection


Edema:  no edema noted Arm (L), no edema noted Arm (R), no edema noted Leg (L), 

no edema noted Leg (R), no edema noted Pedal (L), no edema noted Pedal (R), no 

edema noted Generalized


Neurologic:  motor weakness


Skin:  normal pigmentation, warm/dry





Assessment/Plan


Problem List:  


(1) Hypokalemia


ICD Codes:  E87.6 - Hypokalemia


SNOMED:  12561681


(2) Anemia


ICD Codes:  D64.9 - Anemia, unspecified


SNOMED:  626022065


(3) Marijuana abuse


ICD Codes:  F12.10 - Cannabis abuse, uncomplicated


SNOMED:  89402856


(4) Vomiting


ICD Codes:  R11.10 - Vomiting, unspecified


SNOMED:  589143621


(5) Cannabinoid hyperemesis syndrome


ICD Codes:  R11.2 - Nausea with vomiting, unspecified; F12.90 - Cannabis use, 

unspecified, uncomplicated


SNOMED:  713529824, 085310298


(6) Thrombocytopenia


ICD Codes:  D69.6 - Thrombocytopenia, unspecified


SNOMED:  694890109


(7) Abdominal pain


ICD Codes:  R10.9 - Unspecified abdominal pain


SNOMED:  33354152


Status:  stable, progressing


Assessment/Plan:


detox pain control gi heme f/u dc if clear











Santana Aviles DO         Nov 6, 2020 10:16

## 2020-11-06 NOTE — HEMATOLOGY/ONC PROGRESS NOTE
Assessment/Plan


Assessment/Plan





Assessment and Resc


# Thrombocytopenia acute onset, this is first time she has been admitted, no 

thrombocytopenia history in the past, no recent ingestion, no recent meds, no 

spont abortions


--> hep and hiv ordered


--> us abd ordered


--> if above neg and repeat low plts start steriods


--> r/o DIC as well


--> meds reviewed


--> 180-->60-->23-->143-->120


# Anemia r/o iron deficiency


--> anemia panel ordered--> cw aid


--> transfuse prn


-> per gi eval


--> IV IRON x 5 days started


--> hgb 9.4


# Hypokalemia


--> replete with k


--> ivfs as needed


# Abd pain


-> Zofran and Pepcid


--> do no smoke thc


# Dvt ppx scds





Appreciate consultation and dw RN





Subjective


Constitutional:  Denies: no symptoms, chills, fever, malaise, weakness, other


Cardiovascular:  Denies: no symptoms, chest pain, edema, irregular heart rate, 

lightheadedness, palpitations, syncope, other


Gastrointestinal/Abdominal:  Denies: no symptoms, abdomen distended, abdominal 

pain, black stools, tarry stools, blood in stool, constipated, diarrhea, 

difficulty swallowing, nausea, poor appetite, poor fluid intake, rectal 

bleeding, vomiting, other


Genitourinary:  Denies: no symptoms, burning, discharge, frequency, flank pain, 

hematuria, incontinence, pain, urgency, other


Neurologic/Psychiatric:  Denies: no symptoms, anxiety, depressed, emotional 

problems, headache, numbness, paresthesia, pre-existing deficit, seizure, 

tingling, tremors, weakness, other


Endocrine:  Denies: no symptoms, excessive sweating, flushing, intolerance to 

cold, intolerance to heat, increased hunger, increased thirst, increased urine, 

unexplained weight gain, unexplained weight loss, other


Hematologic/Lymphatic:  Denies: no symptoms, anemia, easy bleeding, easy 

bruising, adenopathy, other


Allergies:  


Coded Allergies:  


     PENICILLINS (Unverified  Allergy, Mild, 11/4/20)


Uncoded Allergies:  


     PENICILLIN (Allergy, Mild, 11/1/20)


Subjective


11/5 no pain, no bleeding, abd feels better, on iv iron at bedside completed


11/6 labs reviewed, meds noted, no bleeding, dw rn, no major events





Objective


Objective





Current Medications








 Medications


  (Trade)  Dose


 Ordered  Sig/Shwetha


 Route


 PRN Reason  Start Time


 Stop Time Status Last Admin


Dose Admin


 


 Famotidine


  (Pepcid)  20 mg  DAILY


 ORAL


   11/5/20 09:00


 2/3/21 08:59  11/6/20 08:56





 


 Hydromorphone HCl


  (Dilaudid)  1 mg  Q4H  PRN


 IVP


 Severe Pain (Pain Scale 7-10)  11/4/20 20:15


 11/11/20 20:14  11/4/20 20:22





 


 Iron Sucrose 100


 mg/Sodium Chloride  60 ml @ 


 240 mls/hr  BEDTIME


 IVPB


   11/4/20 21:00


 11/8/20 21:14  11/5/20 20:26





 


 Ondansetron HCl


  (Zofran)  4 mg  Q4H  PRN


 IVP


 Nausea & Vomiting  11/3/20 16:30


 12/3/20 16:29  11/4/20 18:35














Last 24 Hour Vital Signs








  Date Time  Temp Pulse Resp B/P (MAP) Pulse Ox O2 Delivery O2 Flow Rate FiO2


 


11/6/20 04:00 98.4 71 18 110/61 (77) 97   


 


11/5/20 21:00      Room Air  


 


11/5/20 20:00 98.5 73 18 111/66 (81) 97   


 


11/5/20 16:00 98.2 60 18 113/75 (88) 100   


 


11/5/20 12:00 98.3 60 18 113/75 (88) 99   


 


11/5/20 09:00      Room Air  


 


11/5/20 08:00 98.0 62 18 112/65 (81) 99   


 


11/5/20 04:00 98.1 52 18 103/63 (76) 99   


 


11/5/20 00:00 97.7 59 18 109/64 (79) 99   


 


11/4/20 21:00      Room Air  


 


11/4/20 20:00 98.2 61 15 120/78 (92) 100   


 


11/4/20 16:00 98.6 56 15 111/69 (83) 99   


 


11/4/20 12:00 97.2 62 15 104/67 (79) 99   

















Intake and Output  


 


 11/5/20 11/6/20





 19:00 07:00


 


Intake Total  180 ml


 


Balance  180 ml


 


  


 


Intake Oral  120 ml


 


IV Total  60 ml


 


# Voids  2











Labs








Test


 11/3/20


11:52 11/3/20


14:27 11/4/20


05:21 11/4/20


06:46


 


White Blood Count


 10.7 K/UL


(4.8-10.8) 4.8 K/UL


(4.8-10.8) 10.2 K/UL


(4.8-10.8) 





 


Red Blood Count


 4.67 M/UL


(4.20-5.40) 4.01 M/UL


(4.20-5.40) 3.92 M/UL


(4.20-5.40) 





 


Hemoglobin


 10.3 G/DL


(12.0-16.0) 8.8 G/DL


(12.0-16.0) 8.7 G/DL


(12.0-16.0) 





 


Hematocrit


 36.9 %


(37.0-47.0) 31.9 %


(37.0-47.0) 30.8 %


(37.0-47.0) 





 


Mean Corpuscular Volume 79 FL (80-99)  80 FL (80-99)  79 FL (80-99)  


 


Mean Corpuscular Hemoglobin


 22.1 PG


(27.0-31.0) 22.0 PG


(27.0-31.0) 22.2 PG


(27.0-31.0) 





 


Mean Corpuscular Hemoglobin


Concent 28.0 G/DL


(32.0-36.0) 27.6 G/DL


(32.0-36.0) 28.2 G/DL


(32.0-36.0) 





 


Red Cell Distribution Width


 17.4 %


(11.6-14.8) 17.7 %


(11.6-14.8) 17.1 %


(11.6-14.8) 





 


Platelet Count


 60 K/UL


(150-450) 147 K/UL


(150-450) 23 K/UL


(150-450) 





 


Mean Platelet Volume


 15.6 FL


(6.5-10.1) 11.8 FL


(6.5-10.1) 12.0 FL


(6.5-10.1) 





 


Neutrophils (%) (Auto)


  % (45.0-75.0) 


 68.3 %


(45.0-75.0)  % (45.0-75.0) 


 





 


Lymphocytes (%) (Auto)


  % (20.0-45.0) 


 13.9 %


(20.0-45.0)  % (20.0-45.0) 


 





 


Monocytes (%) (Auto)


  % (1.0-10.0) 


 13.1 %


(1.0-10.0)  % (1.0-10.0) 


 





 


Eosinophils (%) (Auto)


  % (0.0-3.0) 


 0.0 %


(0.0-3.0)  % (0.0-3.0) 


 





 


Basophils (%) (Auto)


  % (0.0-2.0) 


 4.7 %


(0.0-2.0)  % (0.0-2.0) 


 





 


Differential Total Cells


Counted 100 


 


 100 


 





 


Neutrophils % (Manual) 69 % (45-75)   45 % (45-75)  


 


Lymphocytes % (Manual) 25 % (20-45)   40 % (20-45)  


 


Monocytes % (Manual) 6 % (1-10)   13 % (1-10)  


 


Eosinophils % (Manual) 0 % (0-3)   0 % (0-3)  


 


Basophils % (Manual) 0 % (0-2)   2 % (0-2)  


 


Band Neutrophils 0 % (0-8)   0 % (0-8)  


 


Platelet Estimate Adequate   Decreased  


 


Platelet Morphology See comment   Normal  


 


Clumped Platelets 3+    


 


Hypochromasia 2+   2+  


 


Microcytosis 2+   1+  


 


Urine Color Pale yellow    


 


Urine Appearance


 Slightly


cloudy 


 


 





 


Urine pH 6 (4.5-8.0)    


 


Urine Specific Gravity


 1.015


(1.005-1.035) 


 


 





 


Urine Protein


 Negative


(NEGATIVE) 


 


 





 


Urine Glucose (UA)


 Negative


(NEGATIVE) 


 


 





 


Urine Ketones 4+ (NEGATIVE)    


 


Urine Blood


 Negative


(NEGATIVE) 


 


 





 


Urine Nitrite


 Negative


(NEGATIVE) 


 


 





 


Urine Bilirubin


 Negative


(NEGATIVE) 


 


 





 


Urine Urobilinogen


 Normal MG/DL


(0.0-1.0) 


 


 





 


Urine Leukocyte Esterase


 Negative


(NEGATIVE) 


 


 





 


Urine RBC 0 /HPF (0 - 2)    


 


Urine WBC 0 /HPF (0 - 2)    


 


Urine Squamous Epithelial


Cells Few /LPF


(NONE/OCC) 


 


 





 


Urine Bacteria


 Moderate /HPF


(NONE) 


 


 





 


Sodium Level


 139 MMOL/L


(136-145) 


 140 MMOL/L


(136-145) 





 


Potassium Level


 3.0 MMOL/L


(3.5-5.1) 


 3.0 MMOL/L


(3.5-5.1) 





 


Chloride Level


 101 MMOL/L


() 


 106 MMOL/L


() 





 


Carbon Dioxide Level


 22 MMOL/L


(21-32) 


 27 MMOL/L


(21-32) 





 


Anion Gap


 16 mmol/L


(5-15) 


 7 mmol/L


(5-15) 





 


Blood Urea Nitrogen 5 mg/dL (7-18)   2 mg/dL (7-18)  


 


Creatinine


 0.9 MG/DL


(0.55-1.30) 


 0.8 MG/DL


(0.55-1.30) 





 


Estimat Glomerular Filtration


Rate > 60 mL/min


(>60) 


 > 60 mL/min


(>60) 





 


Glucose Level


 83 MG/DL


() 


 96 MG/DL


() 





 


Calcium Level


 8.9 MG/DL


(8.5-10.1) 


 8.0 MG/DL


(8.5-10.1) 





 


Total Bilirubin


 0.6 MG/DL


(0.2-1.0) 


 0.3 MG/DL


(0.2-1.0) 





 


Aspartate Amino Transf


(AST/SGOT) 16 U/L (15-37) 


 


 15 U/L (15-37) 


 





 


Alanine Aminotransferase


(ALT/SGPT) 9 U/L (12-78) 


 


 7 U/L (12-78) 


 





 


Alkaline Phosphatase


 60 U/L


() 


 46 U/L


() 





 


Total Protein


 8.5 G/DL


(6.4-8.2) 


 6.5 G/DL


(6.4-8.2) 





 


Albumin


 4.2 G/DL


(3.4-5.0) 


 3.1 G/DL


(3.4-5.0) 





 


Globulin 4.3 g/dL   3.4 g/dL  


 


Albumin/Globulin Ratio 1.0 (1.0-2.7)   0.9 (1.0-2.7)  


 


Lipase


 226 U/L


() 


 


 





 


Urine Opiates Screen


 Negative


(NEGATIVE) 


 


 





 


Urine Barbiturates Screen


 Negative


(NEGATIVE) 


 


 





 


Phencyclidine (PCP) Screen


 Negative


(NEGATIVE) 


 


 





 


Urine Amphetamines Screen


 Negative


(NEGATIVE) 


 


 





 


Urine Benzodiazepines Screen


 Negative


(NEGATIVE) 


 


 





 


Urine Cocaine Screen


 Negative


(NEGATIVE) 


 


 





 


Urine Marijuana (THC) Screen


 Positive


(NEGATIVE) 


 


 





 


Anisocytosis   1+  


 


Ovalocytes   Occasional  


 


Iron Level


 


 


 9 ug/dL


() 





 


Total Iron Binding Capacity


 


 


 372 ug/dL


(250-450) 





 


Percent Iron Saturation   2 % (15-50)  


 


Unsaturated Iron Binding


 


 


 363 ug/dL


(112-346) 





 


Ferritin


 


 


 7 NG/ML


(8-388) 





 


Lactate Dehydrogenase


 


 


 189 U/L


() 





 


Amylase Level


 


 


 46 U/L


() 





 


Vitamin B12 Level


 


 


 255 PG/ML


(193-986) 





 


Folate


 


 


 13.2 NG/ML


(8.6-58.9) 





 


HIV (1&2) Antibody Rapid


 


 


 Negative


(NEGATIVE) 





 


Stool Occult Blood


 


 


 


 Negative


(NEGATIVE)


 


Test


 11/4/20


08:25 11/5/20


04:50 11/6/20


05:00 





 


White Blood Count


 5.4 K/UL


(4.8-10.8) 5.3 K/UL


(4.8-10.8) 6.1 K/UL


(4.8-10.8) 





 


Red Blood Count


 4.11 M/UL


(4.20-5.40) 3.89 M/UL


(4.20-5.40) 4.22 M/UL


(4.20-5.40) 





 


Hemoglobin


 9.2 G/DL


(12.0-16.0) 8.5 G/DL


(12.0-16.0) 9.4 G/DL


(12.0-16.0) 





 


Hematocrit


 32.2 %


(37.0-47.0) 29.6 %


(37.0-47.0) 33.2 %


(37.0-47.0) 





 


Mean Corpuscular Volume 78 FL (80-99)  76 FL (80-99)  79 FL (80-99)  


 


Mean Corpuscular Hemoglobin


 22.3 PG


(27.0-31.0) 21.9 PG


(27.0-31.0) 22.2 PG


(27.0-31.0) 





 


Mean Corpuscular Hemoglobin


Concent 28.5 G/DL


(32.0-36.0) 28.8 G/DL


(32.0-36.0) 28.2 G/DL


(32.0-36.0) 





 


Red Cell Distribution Width


 17.0 %


(11.6-14.8) 16.7 %


(11.6-14.8) 17.0 %


(11.6-14.8) 





 


Platelet Count


 143 K/UL


(150-450) 292 K/UL


(150-450) 120 K/UL


(150-450) 





 


Mean Platelet Volume


 12.0 FL


(6.5-10.1) 9.0 FL


(6.5-10.1) 9.9 FL


(6.5-10.1) 





 


Neutrophils (%) (Auto)


 47.1 %


(45.0-75.0)  % (45.0-75.0) 


 44.5 %


(45.0-75.0) 





 


Lymphocytes (%) (Auto)


 37.6 %


(20.0-45.0)  % (20.0-45.0) 


 42.9 %


(20.0-45.0) 





 


Monocytes (%) (Auto)


 13.2 %


(1.0-10.0)  % (1.0-10.0) 


 10.7 %


(1.0-10.0) 





 


Eosinophils (%) (Auto)


 0.6 %


(0.0-3.0)  % (0.0-3.0) 


 0.5 %


(0.0-3.0) 





 


Basophils (%) (Auto)


 1.6 %


(0.0-2.0)  % (0.0-2.0) 


 1.4 %


(0.0-2.0) 





 


Hepatitis A IgM Antibody


 Negative


(Negative) 


 


 





 


Hepatitis B Surface Antigen


 Negative


(Negative) 


 


 





 


Hepatitis B Core IgM Antibody


 Negative


(Negative) 


 


 





 


Hepatitis C Antibody


 <0.1 s/co


ratio 


 


 





 


Differential Total Cells


Counted 


 100 


 


 





 


Neutrophils % (Manual)  55 % (45-75)   


 


Lymphocytes % (Manual)  27 % (20-45)   


 


Monocytes % (Manual)  17 % (1-10)   


 


Eosinophils % (Manual)  0 % (0-3)   


 


Basophils % (Manual)  0 % (0-2)   


 


Band Neutrophils  1 % (0-8)   


 


Platelet Estimate  Adequate   


 


Platelet Morphology     


 


Clumped Platelets  3+   


 


Polychromasia  1+   


 


Hypochromasia  1+   


 


Anisocytosis  1+   


 


Microcytosis  1+   


 


Prothrombin Time


 


 13.0 SEC


(9.30-11.50) 12.7 SEC


(9.30-11.50) 





 


Prothromb Time International


Ratio 


 1.2 (0.9-1.1) 


 1.2 (0.9-1.1) 


 





 


Sodium Level


 


 141 MMOL/L


(136-145) 140 MMOL/L


(136-145) 





 


Potassium Level


 


 3.3 MMOL/L


(3.5-5.1) 3.7 MMOL/L


(3.5-5.1) 





 


Chloride Level


 


 106 MMOL/L


() 106 MMOL/L


() 





 


Carbon Dioxide Level


 


 28 MMOL/L


(21-32) 29 MMOL/L


(21-32) 





 


Anion Gap


 


 7 mmol/L


(5-15) 


 





 


Blood Urea Nitrogen  1 mg/dL (7-18)  2 mg/dL (7-18)  


 


Creatinine


 


 0.7 MG/DL


(0.55-1.30) 0.8 MG/DL


(0.55-1.30) 





 


Estimat Glomerular Filtration


Rate 


 > 60 mL/min


(>60) > 60 mL/min


(>60) 





 


Glucose Level


 


 94 MG/DL


() 81 MG/DL


() 





 


Calcium Level


 


 8.2 MG/DL


(8.5-10.1) 8.5 MG/DL


(8.5-10.1) 





 


Total Bilirubin


 


 0.3 MG/DL


(0.2-1.0) 0.3 MG/DL


(0.2-1.0) 





 


Aspartate Amino Transf


(AST/SGOT) 


 16 U/L (15-37) 


 12 U/L (15-37) 


 





 


Alanine Aminotransferase


(ALT/SGPT) 


 7 U/L (12-78) 


 < 6 U/L


(12-78) 





 


Alkaline Phosphatase


 


 45 U/L


() 53 U/L


() 





 


Total Protein


 


 5.8 G/DL


(6.4-8.2) 6.2 G/DL


(6.4-8.2) 





 


Albumin


 


 2.9 G/DL


(3.4-5.0) 3.2 G/DL


(3.4-5.0) 





 


Globulin  2.9 g/dL  3.0 g/dL  


 


Albumin/Globulin Ratio  1.0 (1.0-2.7)  1.1 (1.0-2.7)  








Height (Feet):  5


Height (Inches):  7.00


Weight (Pounds):  174


Objective


GENERAL: Awake alert


HEENT: Extraocular muscles are intact. 


RESP: Normal respiratory effort. Symmetric rise. No stridor. Ctab


CARDIAC: Bradycardic and regular rhytm. No_significant pedal edema.


Negative Langford sign.  Negative Rovsing's.  


ABDOMEN: Soft. Nondistended.  


MSK:  Normal muscle tone, without rigidity. 


SKIN: Warm and dry.  No visible cyanosis


NEUROLOGIC: Alert, oriented x3. 


Psych: Normal mood and affect











Master Fontenot MD           Nov 6, 2020 09:26

## 2020-11-06 NOTE — NUR
NURSE HAND-OFF: 



Important Events on Shift:No c/o abd pain,no n/v

Patient Status: Stable 

Diet: Regular 



Pending Orders: none 

Pending Results/Labs:none 

Pending MD notification:none 



Latest Vital Signs: Temperature 98.4 , Pulse 71 , B/P 110 /61 , Respiratory Rate 18 , O2 SAT 
97 , Room Air, O2 Flow Rate .  

Vital Sign Comment: 



Latest Leigh Fall Score: 35  

Fall Risk: Medium Risk 

Safety Measures: Call light Within Reach, Bed Alarm Zone 2, Side Rails Side Rails x2, Bed 
position Low and Locked.

Fall Precautions: 

Yellow Socks



Report given to .

## 2020-11-06 NOTE — GENERAL PROGRESS NOTE
Subjective


ROS Limited/Unobtainable:  No


Allergies:  


Coded Allergies:  


     PENICILLINS (Unverified  Allergy, Mild, 11/4/20)


Uncoded Allergies:  


     PENICILLIN (Allergy, Mild, 11/1/20)





Objective





Last 24 Hour Vital Signs








  Date Time  Temp Pulse Resp B/P (MAP) Pulse Ox O2 Delivery O2 Flow Rate FiO2


 


11/6/20 09:00      Room Air  


 


11/6/20 08:00 98.3 64 20 113/69 (84) 100   


 


11/6/20 04:00 98.4 71 18 110/61 (77) 97   


 


11/5/20 21:00      Room Air  


 


11/5/20 20:00 98.5 73 18 111/66 (81) 97   


 


11/5/20 16:00 98.2 60 18 113/75 (88) 100   

















Intake and Output  


 


 11/5/20 11/6/20





 19:00 07:00


 


Intake Total  180 ml


 


Balance  180 ml


 


  


 


Intake Oral  120 ml


 


IV Total  60 ml


 


# Voids  2








Laboratory Tests


11/6/20 05:00: 


White Blood Count 6.1, Red Blood Count 4.22, Hemoglobin 9.4L, Hematocrit 33.2L, 

Mean Corpuscular Volume 79L, Mean Corpuscular Hemoglobin 22.2L, Mean Corpuscular

Hemoglobin Concent 28.2L, Red Cell Distribution Width 17.0H, Platelet Count 

120#L, Mean Platelet Volume 9.9, Neutrophils (%) (Auto) 44.5L, Lymphocytes (%) 

(Auto) 42.9, Monocytes (%) (Auto) 10.7H, Eosinophils (%) (Auto) 0.5, Basophils 

(%) (Auto) 1.4, Prothrombin Time 12.7H, Prothromb Time International Ratio 1.2H,

Sodium Level 140, Potassium Level 3.7, Chloride Level 106, Carbon Dioxide Level 

29, Blood Urea Nitrogen 2L, Creatinine 0.8, Estimat Glomerular Filtration Rate >

60, Glucose Level 81, Calcium Level 8.5, Total Bilirubin 0.3, Aspartate Amino 

Transf (AST/SGOT) 12L, Alanine Aminotransferase (ALT/SGPT) < 6L, Alkaline 

Phosphatase 53, Total Protein 6.2L, Albumin 3.2L, Globulin 3.0, Albumin/Globulin

Ratio 1.1


Height (Feet):  5


Height (Inches):  7.00


Weight (Pounds):  174


General Appearance:  alert


EENT:  normal ENT inspection


Neck:  normal alignment


Cardiovascular:  normal rate


Respiratory/Chest:  decreased breath sounds


Abdomen:  hypoactive bowel sounds


Extremities:  non-tender





Assessment/Plan


Problem List:  


(1) Cannabinoid hyperemesis syndrome


ICD Codes:  R11.2 - Nausea with vomiting, unspecified; F12.90 - Cannabis use, un

specified, uncomplicated


SNOMED:  087277250, [id number]


(2) Vomiting


ICD Codes:  R11.10 - Vomiting, unspecified


SNOMED:  191239986


(3) Abdominal pain


ICD Codes:  R10.9 - Unspecified abdominal pain


SNOMED:  20698441


(4) Thrombocytopenia


ICD Codes:  D69.6 - Thrombocytopenia, unspecified


SNOMED:  389649942


(5) Anemia


ICD Codes:  D64.9 - Anemia, unspecified


SNOMED:  704339658


(6) Hypokalemia


ICD Codes:  E87.6 - Hypokalemia


SNOMED:  43197342


Status:  stable, progressing


Assessment/Plan:





pain control


control nausea


anemia work up>>> irob def>>> add venofer>>> fu stool ob>>>neg


repeat labs


avoid THC


on reg diet


hem in put appreciated











Lewis Barnett MD              Nov 6, 2020 12:07

## 2020-11-06 NOTE — NUR
NURSE NOTES:

Received report from Franco COLMENARES. Patient is awake and oriented x4. Patient denies pain. IV L FA 
intact and patent. Side rails up x3 and bed low and locked. patient appears comfortable. 
Will continue to monitor

## 2020-11-09 NOTE — DISCHARGE SUMMARY
Discharge Summary


Discharge Summary


_


DATE OF ADMISSION: 2020


DATE OF DISCHARGE: 2020





DISCHARGED BY: Dr. Santana Aviles





CONSULTANTS: 


Dr. Bette Fontenot





BRIEF HOSPITAL COURSE:


Patient is a 35-year-old female, who lives at home, presented due to 3 days of 

abdominal pain, nausea and vomiting.  Patient was unable to recall if she ate 

anything bad, she presented to [facility].  She was seen the day prior at 

the ED.  She stated that she had been taking Zofran and Pepcid without relief of

her abdominal pain, nausea and vomiting.  She denied melena, hematochezia, 

dysuria, hematuria, fall, trauma, fever, chest pain, shortness of breath, cough 

or any other symptom.  Symptoms were gradual in onset.  She has history of 

marijuana use.





Upon evaluation at ED, vital signs were stable.  Patient was actively retching o

n examination.  She had diffuse voluntary guarding without any evidence of 

peritonitis.  Blood work showed new onset of thrombocytopenia.  No evidence of 

TTP.  No evidence of DIC.  She was not actively bleeding.  Platelets were down 

compared to prior lab few days ago.  CT of the abdomen was negative for acute 

surgical pathology.  Patient was then admitted for evaluation of abdominal pain 

and thrombocytopenia.





Patient was admitted to medical floor.  Diet was advanced.  She was given 

potassium replacement.  She was given pain management.  She was continued on 

Zofran and Pepcid.  She was counseled marijuana cessation.





Patient has new onset thrombocytopenia.  She denied any prior history of 

thrombocytopenia, no recent ingestion, no recent medications nor spontaneous 

.





Anemia work-up showed iron deficiency.  She was given IV Venofer.  Stool OB was 

negative.





Abdominal ultrasound was negative.  Spleen was unremarkable.  Liver with normal 

echogenicity.  HIV screen negative.  Hepatitis panel negative.





Platelet count eventually went up.  Patient was cleared for discharge home.





FINAL DIAGNOSES: 


Cannabinoid hyperemesis syndrome,


Thrombocytopenia


Iron deficiency anemia


Hypokalemia


Marijuana abuse





DISPOSITION: Patient was discharged home.





DISCHARGE MEDICATIONS: Refer to Discharge Medication List.





DISCHARGE INSTRUCTIONS: Follow-up in a week.








I have been assigned to complete a discharge summary on this account, I was not 

involved with the patient's management.--YAZMIN Persaud Jacqueline Robles NP    2020 01:02